# Patient Record
Sex: MALE | Race: WHITE | NOT HISPANIC OR LATINO | Employment: OTHER | URBAN - METROPOLITAN AREA
[De-identification: names, ages, dates, MRNs, and addresses within clinical notes are randomized per-mention and may not be internally consistent; named-entity substitution may affect disease eponyms.]

---

## 2021-01-11 ENCOUNTER — APPOINTMENT (EMERGENCY)
Dept: CT IMAGING | Facility: HOSPITAL | Age: 81
DRG: 392 | End: 2021-01-11
Payer: MEDICARE

## 2021-01-11 ENCOUNTER — APPOINTMENT (EMERGENCY)
Dept: RADIOLOGY | Facility: HOSPITAL | Age: 81
DRG: 392 | End: 2021-01-11
Payer: MEDICARE

## 2021-01-11 ENCOUNTER — HOSPITAL ENCOUNTER (INPATIENT)
Facility: HOSPITAL | Age: 81
LOS: 1 days | Discharge: HOME | DRG: 392 | End: 2021-01-12
Attending: EMERGENCY MEDICINE | Admitting: INTERNAL MEDICINE
Payer: MEDICARE

## 2021-01-11 DIAGNOSIS — H10.9 CONJUNCTIVITIS: ICD-10-CM

## 2021-01-11 DIAGNOSIS — R11.2 NAUSEA & VOMITING: Primary | ICD-10-CM

## 2021-01-11 DIAGNOSIS — E44.0 MODERATE PROTEIN-CALORIE MALNUTRITION (HCC): ICD-10-CM

## 2021-01-11 DIAGNOSIS — J69.0 ASPIRATION PNEUMONITIS (HCC): ICD-10-CM

## 2021-01-11 PROBLEM — F34.1 DYSTHYMIA: Status: ACTIVE | Noted: 2021-01-11

## 2021-01-11 PROBLEM — J96.01 ACUTE RESPIRATORY FAILURE WITH HYPOXIA (HCC): Status: ACTIVE | Noted: 2021-01-11

## 2021-01-11 PROBLEM — J44.9 COPD (CHRONIC OBSTRUCTIVE PULMONARY DISEASE) (HCC): Status: ACTIVE | Noted: 2021-01-11

## 2021-01-11 PROBLEM — Z72.0 TOBACCO ABUSE: Status: ACTIVE | Noted: 2021-01-11

## 2021-01-11 PROBLEM — I10 ESSENTIAL HYPERTENSION: Status: ACTIVE | Noted: 2021-01-11

## 2021-01-11 PROBLEM — N18.9 CKD (CHRONIC KIDNEY DISEASE): Status: ACTIVE | Noted: 2021-01-11

## 2021-01-11 LAB
ALBUMIN SERPL BCP-MCNC: 3.9 G/DL (ref 3.5–5)
ALP SERPL-CCNC: 128 U/L (ref 46–116)
ALT SERPL W P-5'-P-CCNC: 18 U/L (ref 12–78)
ANION GAP SERPL CALCULATED.3IONS-SCNC: 12 MMOL/L (ref 4–13)
AST SERPL W P-5'-P-CCNC: 23 U/L (ref 5–45)
BASOPHILS # BLD AUTO: 0.03 THOUSANDS/ΜL (ref 0–0.1)
BASOPHILS NFR BLD AUTO: 0 % (ref 0–1)
BILIRUB SERPL-MCNC: 0.9 MG/DL (ref 0.2–1)
BUN SERPL-MCNC: 19 MG/DL (ref 5–25)
CALCIUM SERPL-MCNC: 9.8 MG/DL (ref 8.3–10.1)
CHLORIDE SERPL-SCNC: 102 MMOL/L (ref 100–108)
CO2 SERPL-SCNC: 27 MMOL/L (ref 21–32)
CREAT SERPL-MCNC: 1.16 MG/DL (ref 0.6–1.3)
EOSINOPHIL # BLD AUTO: 0 THOUSAND/ΜL (ref 0–0.61)
EOSINOPHIL NFR BLD AUTO: 0 % (ref 0–6)
ERYTHROCYTE [DISTWIDTH] IN BLOOD BY AUTOMATED COUNT: 12.5 % (ref 11.6–15.1)
FLUAV RNA RESP QL NAA+PROBE: NEGATIVE
FLUBV RNA RESP QL NAA+PROBE: NEGATIVE
GFR SERPL CREATININE-BSD FRML MDRD: 59 ML/MIN/1.73SQ M
GLUCOSE SERPL-MCNC: 143 MG/DL (ref 65–140)
HCT VFR BLD AUTO: 49.3 % (ref 36.5–49.3)
HGB BLD-MCNC: 16.4 G/DL (ref 12–17)
IMM GRANULOCYTES # BLD AUTO: 0.03 THOUSAND/UL (ref 0–0.2)
IMM GRANULOCYTES NFR BLD AUTO: 0 % (ref 0–2)
LIPASE SERPL-CCNC: 61 U/L (ref 73–393)
LYMPHOCYTES # BLD AUTO: 1.1 THOUSANDS/ΜL (ref 0.6–4.47)
LYMPHOCYTES NFR BLD AUTO: 15 % (ref 14–44)
MCH RBC QN AUTO: 33 PG (ref 26.8–34.3)
MCHC RBC AUTO-ENTMCNC: 33.3 G/DL (ref 31.4–37.4)
MCV RBC AUTO: 99 FL (ref 82–98)
MONOCYTES # BLD AUTO: 0.16 THOUSAND/ΜL (ref 0.17–1.22)
MONOCYTES NFR BLD AUTO: 2 % (ref 4–12)
NEUTROPHILS # BLD AUTO: 6.15 THOUSANDS/ΜL (ref 1.85–7.62)
NEUTS SEG NFR BLD AUTO: 83 % (ref 43–75)
NRBC BLD AUTO-RTO: 0 /100 WBCS
PLATELET # BLD AUTO: 252 THOUSANDS/UL (ref 149–390)
PMV BLD AUTO: 10.4 FL (ref 8.9–12.7)
POTASSIUM SERPL-SCNC: 4.3 MMOL/L (ref 3.5–5.3)
PROT SERPL-MCNC: 7.5 G/DL (ref 6.4–8.2)
RBC # BLD AUTO: 4.97 MILLION/UL (ref 3.88–5.62)
RSV RNA RESP QL NAA+PROBE: NEGATIVE
SARS-COV-2 RNA RESP QL NAA+PROBE: NEGATIVE
SODIUM SERPL-SCNC: 141 MMOL/L (ref 136–145)
WBC # BLD AUTO: 7.47 THOUSAND/UL (ref 4.31–10.16)

## 2021-01-11 PROCEDURE — 36415 COLL VENOUS BLD VENIPUNCTURE: CPT

## 2021-01-11 PROCEDURE — 85025 COMPLETE CBC W/AUTO DIFF WBC: CPT | Performed by: EMERGENCY MEDICINE

## 2021-01-11 PROCEDURE — 0241U HB NFCT DS VIR RESP RNA 4 TRGT: CPT | Performed by: EMERGENCY MEDICINE

## 2021-01-11 PROCEDURE — 96375 TX/PRO/DX INJ NEW DRUG ADDON: CPT

## 2021-01-11 PROCEDURE — 96374 THER/PROPH/DIAG INJ IV PUSH: CPT

## 2021-01-11 PROCEDURE — 74176 CT ABD & PELVIS W/O CONTRAST: CPT

## 2021-01-11 PROCEDURE — 80053 COMPREHEN METABOLIC PANEL: CPT | Performed by: EMERGENCY MEDICINE

## 2021-01-11 PROCEDURE — 93005 ELECTROCARDIOGRAM TRACING: CPT

## 2021-01-11 PROCEDURE — 83690 ASSAY OF LIPASE: CPT | Performed by: EMERGENCY MEDICINE

## 2021-01-11 PROCEDURE — 99285 EMERGENCY DEPT VISIT HI MDM: CPT

## 2021-01-11 PROCEDURE — 99285 EMERGENCY DEPT VISIT HI MDM: CPT | Performed by: EMERGENCY MEDICINE

## 2021-01-11 PROCEDURE — 71045 X-RAY EXAM CHEST 1 VIEW: CPT

## 2021-01-11 PROCEDURE — G1004 CDSM NDSC: HCPCS

## 2021-01-11 RX ORDER — ONDANSETRON 2 MG/ML
4 INJECTION INTRAMUSCULAR; INTRAVENOUS ONCE
Status: COMPLETED | OUTPATIENT
Start: 2021-01-11 | End: 2021-01-11

## 2021-01-11 RX ORDER — MIRTAZAPINE 30 MG/1
30 TABLET, FILM COATED ORAL
COMMUNITY

## 2021-01-11 RX ORDER — OXYCODONE HYDROCHLORIDE 5 MG/1
5 CAPSULE ORAL EVERY 8 HOURS
COMMUNITY
End: 2021-05-18 | Stop reason: HOSPADM

## 2021-01-11 RX ORDER — ASPIRIN 81 MG/1
81 TABLET ORAL DAILY
COMMUNITY
End: 2021-05-07 | Stop reason: HOSPADM

## 2021-01-11 RX ORDER — CHLORTHALIDONE 25 MG/1
25 TABLET ORAL DAILY
COMMUNITY

## 2021-01-11 RX ORDER — PREDNISONE 10 MG/1
TABLET ORAL DAILY
COMMUNITY
End: 2021-05-18 | Stop reason: HOSPADM

## 2021-01-11 RX ORDER — METOCLOPRAMIDE HYDROCHLORIDE 5 MG/ML
5 INJECTION INTRAMUSCULAR; INTRAVENOUS ONCE
Status: COMPLETED | OUTPATIENT
Start: 2021-01-11 | End: 2021-01-11

## 2021-01-11 RX ORDER — CILOSTAZOL 100 MG/1
100 TABLET ORAL 2 TIMES DAILY
COMMUNITY
End: 2021-05-07 | Stop reason: HOSPADM

## 2021-01-11 RX ADMIN — METOCLOPRAMIDE HYDROCHLORIDE 5 MG: 5 INJECTION INTRAMUSCULAR; INTRAVENOUS at 23:07

## 2021-01-11 RX ADMIN — ONDANSETRON 4 MG: 2 INJECTION INTRAMUSCULAR; INTRAVENOUS at 22:16

## 2021-01-11 NOTE — ED PROVIDER NOTES
History  Chief Complaint   Patient presents with    Vomiting     patient presents to the ED via EMS with c/o nausea nd vomiting today, recieved zofran in route and feels better      79-year-old male presents for evaluation of nausea vomiting today with mild associated abdominal discomfort  The patient has an associated nonproductive cough which began today as well  The patient has a history of CAD, hypertension, kidney disease and COPD  The patient was given Zofran via EMS and is improved  The patient denies any sick contacts  Prior to Admission Medications   Prescriptions Last Dose Informant Patient Reported? Taking?   aspirin (ECOTRIN LOW STRENGTH) 81 mg EC tablet   Yes Yes   Sig: Take 81 mg by mouth daily   chlorthalidone 25 mg tablet   Yes Yes   Sig: Take 25 mg by mouth daily   cilostazol (PLETAL) 100 mg tablet   Yes Yes   Sig: Take 100 mg by mouth 2 (two) times a day   mirtazapine (REMERON) 30 mg tablet   Yes Yes   Sig: Take 30 mg by mouth daily at bedtime   oxyCODONE (OXY-IR) 5 MG capsule   Yes Yes   Sig: Take 5 mg by mouth every 8 (eight) hours   predniSONE 10 mg tablet   Yes Yes   Sig: Take by mouth daily      Facility-Administered Medications: None       Past Medical History:   Diagnosis Date    COPD (chronic obstructive pulmonary disease) (HCC)     MI, old        Past Surgical History:   Procedure Laterality Date    BELOW KNEE LEG AMPUTATION      CARDIAC SURGERY      HERNIA REPAIR         History reviewed  No pertinent family history  I have reviewed and agree with the history as documented  E-Cigarette/Vaping     E-Cigarette/Vaping Substances     Social History     Tobacco Use    Smoking status: Current Every Day Smoker     Packs/day: 1 00     Types: Cigarettes    Smokeless tobacco: Never Used   Substance Use Topics    Alcohol use: Yes     Comment: socially     Drug use: Not Currently       Review of Systems   Constitutional: Positive for chills  Negative for fatigue and fever  HENT: Negative for sore throat  Respiratory: Positive for cough  Negative for chest tightness and shortness of breath  Cardiovascular: Negative for chest pain and palpitations  Gastrointestinal: Positive for abdominal pain, nausea and vomiting  Negative for blood in stool, constipation and diarrhea  Genitourinary: Negative for difficulty urinating and dysuria  Musculoskeletal: Negative for back pain  Skin: Negative for rash  Neurological: Negative for dizziness, seizures, syncope, weakness and headaches  All other systems reviewed and are negative  Physical Exam  Physical Exam  Vitals signs and nursing note reviewed  Constitutional:       General: He is not in acute distress  Appearance: He is well-developed  HENT:      Head: Normocephalic and atraumatic  Right Ear: External ear normal       Left Ear: External ear normal       Mouth/Throat:      Pharynx: No oropharyngeal exudate  Eyes:      General: No scleral icterus  Pupils: Pupils are equal, round, and reactive to light  Neck:      Musculoskeletal: Normal range of motion and neck supple  Cardiovascular:      Rate and Rhythm: Normal rate and regular rhythm  Heart sounds: Normal heart sounds  Pulmonary:      Effort: Pulmonary effort is normal  No respiratory distress  Breath sounds: Normal breath sounds  Abdominal:      General: Bowel sounds are normal       Palpations: Abdomen is soft  Tenderness: There is no abdominal tenderness  There is no guarding or rebound  Musculoskeletal: Normal range of motion  Skin:     General: Skin is warm and dry  Findings: No rash  Neurological:      Mental Status: He is alert and oriented to person, place, and time           Vital Signs  ED Triage Vitals [01/11/21 1828]   Temperature Pulse Respirations Blood Pressure SpO2   (!) 97 1 °F (36 2 °C) 82 20 163/86 95 %      Temp Source Heart Rate Source Patient Position - Orthostatic VS BP Location FiO2 (%) Tympanic Monitor -- -- --      Pain Score       --           Vitals:    01/11/21 1828   BP: 163/86   Pulse: 82         Visual Acuity      ED Medications  Medications - No data to display    Diagnostic Studies  Results Reviewed     Procedure Component Value Units Date/Time    COVID19, Influenza A/B, RSV PCR, [facility] [[id number]]     Lab Status: No result Specimen: Nares from Nasopharyngeal Swab     CBC and differential [[id number]]  (Abnormal) Collected: 01/11/21 1830    Lab Status: Final result Specimen: Blood from Arm, Left Updated: 01/11/21 1837     WBC 7 47 Thousand/uL      RBC 4 97 Million/uL      Hemoglobin 16 4 g/dL      Hematocrit 49 3 %      MCV 99 fL      MCH 33 0 pg      MCHC 33 3 g/dL      RDW 12 5 %      MPV 10 4 fL      Platelets 476 Thousands/uL      nRBC 0 /100 WBCs      Neutrophils Relative 83 %      Immat GRANS % 0 %      Lymphocytes Relative 15 %      Monocytes Relative 2 %      Eosinophils Relative 0 %      Basophils Relative 0 %      Neutrophils Absolute 6 15 Thousands/µL      Immature Grans Absolute 0 03 Thousand/uL      Lymphocytes Absolute 1 10 Thousands/µL      Monocytes Absolute 0 16 Thousand/µL      Eosinophils Absolute 0 00 Thousand/µL      Basophils Absolute 0 03 Thousands/µL     Comprehensive metabolic panel [[id number]] Collected: 01/11/21 1830    Lab Status: In process Specimen: Blood from Arm, Left Updated: 01/11/21 1834    Lipase [[id number]] Collected: 01/11/21 1830    Lab Status: In process Specimen: Blood from Arm, Left Updated: 01/11/21 1834                 XR chest 1 view portable    (Results Pending)   CT abdomen pelvis wo contrast    (Results Pending)              Procedures  ECG 12 Lead Documentation Only    Date/Time: 1/11/2021 6:53 PM  Performed by: Jessa Blanco DO  Authorized by:  Jessa Blanco DO     Indications / Diagnosis:  Shortness of breath  ECG reviewed by me, the ED Provider: yes    Patient location:  ED  Previous ECG:     Previous ECG:  Compared to current Comparison ECG info:  10/29/2014    Similarity:  No change  Interpretation:     Interpretation: normal    Rate:     ECG rate:  71    ECG rate assessment: normal    Rhythm:     Rhythm: sinus rhythm    Ectopy:     Ectopy: none    QRS:     QRS axis:  Normal    QRS intervals:  Normal  Conduction:     Conduction: normal    ST segments:     ST segments:  Normal  T waves:     T waves: normal               ED Course  ED Course as of Jan 12 1601   Mon Jan 11, 2021   2151 Patient with recurrent nausea and feeling like he is going to vomit upon re-evaluation  Will re-dose with Zofran then PO p o  Trial                                              MDM  Number of Diagnoses or Management Options  Aspiration pneumonitis [facility]):   Nausea & vomiting:   Diagnosis management comments: Plan is to obtain laboratories and CT the abdomen and chest x-ray the differential diagnosis includes viral enteritis, pneumonia, COVID, anemia, electrolyte disturbance, obstruction, ileus, other        Disposition  Final diagnoses:   None     ED Disposition     None      Follow-up Information    None         Patient's Medications   Discharge Prescriptions    No medications on file     No discharge procedures on file      PDMP Review     None          ED Provider  Electronically Signed by           Artem Rosa DO  01/12/21 4300

## 2021-01-12 ENCOUNTER — HOSPITAL ENCOUNTER (EMERGENCY)
Facility: HOSPITAL | Age: 81
Discharge: HOME | End: 2021-01-12
Attending: EMERGENCY MEDICINE | Admitting: EMERGENCY MEDICINE
Payer: MEDICARE

## 2021-01-12 ENCOUNTER — TELEPHONE (OUTPATIENT)
Dept: OTHER | Facility: OTHER | Age: 81
End: 2021-01-12

## 2021-01-12 VITALS
DIASTOLIC BLOOD PRESSURE: 80 MMHG | BODY MASS INDEX: 17.27 KG/M2 | WEIGHT: 110 LBS | HEART RATE: 71 BPM | OXYGEN SATURATION: 95 % | HEIGHT: 67 IN | RESPIRATION RATE: 35 BRPM | TEMPERATURE: 97.1 F | SYSTOLIC BLOOD PRESSURE: 161 MMHG

## 2021-01-12 VITALS
TEMPERATURE: 97.4 F | RESPIRATION RATE: 18 BRPM | SYSTOLIC BLOOD PRESSURE: 141 MMHG | DIASTOLIC BLOOD PRESSURE: 67 MMHG | HEART RATE: 74 BPM | OXYGEN SATURATION: 94 %

## 2021-01-12 DIAGNOSIS — F32.A ANXIETY AND DEPRESSION: ICD-10-CM

## 2021-01-12 DIAGNOSIS — F41.9 ANXIETY AND DEPRESSION: ICD-10-CM

## 2021-01-12 DIAGNOSIS — R11.2 NAUSEA AND VOMITING: Primary | ICD-10-CM

## 2021-01-12 PROBLEM — E44.0 MODERATE PROTEIN-CALORIE MALNUTRITION (HCC): Status: ACTIVE | Noted: 2021-01-12

## 2021-01-12 PROBLEM — J96.11 CHRONIC RESPIRATORY FAILURE WITH HYPOXIA (HCC): Status: ACTIVE | Noted: 2021-01-11

## 2021-01-12 PROBLEM — Z89.512: Status: ACTIVE | Noted: 2021-01-12

## 2021-01-12 LAB
ALBUMIN SERPL BCP-MCNC: 3.4 G/DL (ref 3.5–5)
ALP SERPL-CCNC: 110 U/L (ref 46–116)
ALT SERPL W P-5'-P-CCNC: 25 U/L (ref 12–78)
ANION GAP SERPL CALCULATED.3IONS-SCNC: 7 MMOL/L (ref 4–13)
ANION GAP SERPL CALCULATED.3IONS-SCNC: 9 MMOL/L (ref 4–13)
AST SERPL W P-5'-P-CCNC: 40 U/L (ref 5–45)
ATRIAL RATE: 71 BPM
BASOPHILS # BLD AUTO: 0.02 THOUSANDS/ΜL (ref 0–0.1)
BASOPHILS # BLD AUTO: 0.03 THOUSANDS/ΜL (ref 0–0.1)
BASOPHILS NFR BLD AUTO: 0 % (ref 0–1)
BASOPHILS NFR BLD AUTO: 0 % (ref 0–1)
BILIRUB SERPL-MCNC: 1 MG/DL (ref 0.2–1)
BUN SERPL-MCNC: 21 MG/DL (ref 5–25)
BUN SERPL-MCNC: 21 MG/DL (ref 5–25)
CALCIUM ALBUM COR SERPL-MCNC: 9.8 MG/DL (ref 8.3–10.1)
CALCIUM SERPL-MCNC: 8.7 MG/DL (ref 8.3–10.1)
CALCIUM SERPL-MCNC: 9.3 MG/DL (ref 8.3–10.1)
CHLORIDE SERPL-SCNC: 102 MMOL/L (ref 100–108)
CHLORIDE SERPL-SCNC: 105 MMOL/L (ref 100–108)
CO2 SERPL-SCNC: 27 MMOL/L (ref 21–32)
CO2 SERPL-SCNC: 28 MMOL/L (ref 21–32)
CREAT SERPL-MCNC: 0.99 MG/DL (ref 0.6–1.3)
CREAT SERPL-MCNC: 1.13 MG/DL (ref 0.6–1.3)
EOSINOPHIL # BLD AUTO: 0 THOUSAND/ΜL (ref 0–0.61)
EOSINOPHIL # BLD AUTO: 0.01 THOUSAND/ΜL (ref 0–0.61)
EOSINOPHIL NFR BLD AUTO: 0 % (ref 0–6)
EOSINOPHIL NFR BLD AUTO: 0 % (ref 0–6)
ERYTHROCYTE [DISTWIDTH] IN BLOOD BY AUTOMATED COUNT: 12.7 % (ref 11.6–15.1)
ERYTHROCYTE [DISTWIDTH] IN BLOOD BY AUTOMATED COUNT: 13.1 % (ref 11.6–15.1)
GFR SERPL CREATININE-BSD FRML MDRD: 61 ML/MIN/1.73SQ M
GFR SERPL CREATININE-BSD FRML MDRD: 71 ML/MIN/1.73SQ M
GLUCOSE SERPL-MCNC: 102 MG/DL (ref 65–140)
GLUCOSE SERPL-MCNC: 109 MG/DL (ref 65–140)
HCT VFR BLD AUTO: 41.4 % (ref 36.5–49.3)
HCT VFR BLD AUTO: 46.8 % (ref 36.5–49.3)
HGB BLD-MCNC: 13.7 G/DL (ref 12–17)
HGB BLD-MCNC: 15.1 G/DL (ref 12–17)
IMM GRANULOCYTES # BLD AUTO: 0.01 THOUSAND/UL (ref 0–0.2)
IMM GRANULOCYTES # BLD AUTO: 0.05 THOUSAND/UL (ref 0–0.2)
IMM GRANULOCYTES NFR BLD AUTO: 0 % (ref 0–2)
IMM GRANULOCYTES NFR BLD AUTO: 1 % (ref 0–2)
LYMPHOCYTES # BLD AUTO: 1.55 THOUSANDS/ΜL (ref 0.6–4.47)
LYMPHOCYTES # BLD AUTO: 1.77 THOUSANDS/ΜL (ref 0.6–4.47)
LYMPHOCYTES NFR BLD AUTO: 17 % (ref 14–44)
LYMPHOCYTES NFR BLD AUTO: 17 % (ref 14–44)
MCH RBC QN AUTO: 32.8 PG (ref 26.8–34.3)
MCH RBC QN AUTO: 33.1 PG (ref 26.8–34.3)
MCHC RBC AUTO-ENTMCNC: 32.3 G/DL (ref 31.4–37.4)
MCHC RBC AUTO-ENTMCNC: 33.1 G/DL (ref 31.4–37.4)
MCV RBC AUTO: 100 FL (ref 82–98)
MCV RBC AUTO: 102 FL (ref 82–98)
MONOCYTES # BLD AUTO: 1.05 THOUSAND/ΜL (ref 0.17–1.22)
MONOCYTES # BLD AUTO: 1.16 THOUSAND/ΜL (ref 0.17–1.22)
MONOCYTES NFR BLD AUTO: 10 % (ref 4–12)
MONOCYTES NFR BLD AUTO: 13 % (ref 4–12)
NEUTROPHILS # BLD AUTO: 6.27 THOUSANDS/ΜL (ref 1.85–7.62)
NEUTROPHILS # BLD AUTO: 7.81 THOUSANDS/ΜL (ref 1.85–7.62)
NEUTS SEG NFR BLD AUTO: 70 % (ref 43–75)
NEUTS SEG NFR BLD AUTO: 72 % (ref 43–75)
NRBC BLD AUTO-RTO: 0 /100 WBCS
P AXIS: 60 DEGREES
PLATELET # BLD AUTO: 206 THOUSANDS/UL (ref 149–390)
PLATELET # BLD AUTO: 215 THOUSANDS/UL (ref 149–390)
PMV BLD AUTO: 10.1 FL (ref 8.9–12.7)
PMV BLD AUTO: 10.1 FL (ref 8.9–12.7)
POTASSIUM SERPL-SCNC: 3.6 MMOL/L (ref 3.5–5.3)
POTASSIUM SERPL-SCNC: 3.7 MMOL/L (ref 3.5–5.3)
PR INTERVAL: 146 MS
PROT SERPL-MCNC: 7.1 G/DL (ref 6.4–8.2)
QRS AXIS: 56 DEGREES
QRSD INTERVAL: 80 MS
QT INTERVAL: 438 MS
QTC INTERVAL: 475 MS
RBC # BLD AUTO: 4.14 MILLION/UL (ref 3.88–5.62)
RBC # BLD AUTO: 4.6 MILLION/UL (ref 3.88–5.62)
SODIUM SERPL-SCNC: 137 MMOL/L (ref 136–145)
SODIUM SERPL-SCNC: 141 MMOL/L (ref 136–145)
T WAVE AXIS: 32 DEGREES
VENTRICULAR RATE: 71 BPM
WBC # BLD AUTO: 10.72 THOUSAND/UL (ref 4.31–10.16)
WBC # BLD AUTO: 9.01 THOUSAND/UL (ref 4.31–10.16)

## 2021-01-12 PROCEDURE — 93010 ELECTROCARDIOGRAM REPORT: CPT | Performed by: INTERNAL MEDICINE

## 2021-01-12 PROCEDURE — 99284 EMERGENCY DEPT VISIT MOD MDM: CPT | Performed by: EMERGENCY MEDICINE

## 2021-01-12 PROCEDURE — 99285 EMERGENCY DEPT VISIT HI MDM: CPT

## 2021-01-12 PROCEDURE — 36415 COLL VENOUS BLD VENIPUNCTURE: CPT | Performed by: INTERNAL MEDICINE

## 2021-01-12 PROCEDURE — 85025 COMPLETE CBC W/AUTO DIFF WBC: CPT | Performed by: INTERNAL MEDICINE

## 2021-01-12 PROCEDURE — 80048 BASIC METABOLIC PNL TOTAL CA: CPT | Performed by: INTERNAL MEDICINE

## 2021-01-12 PROCEDURE — 80053 COMPREHEN METABOLIC PANEL: CPT | Performed by: EMERGENCY MEDICINE

## 2021-01-12 PROCEDURE — 99236 HOSP IP/OBS SAME DATE HI 85: CPT | Performed by: INTERNAL MEDICINE

## 2021-01-12 PROCEDURE — 85025 COMPLETE CBC W/AUTO DIFF WBC: CPT | Performed by: EMERGENCY MEDICINE

## 2021-01-12 RX ORDER — MIRTAZAPINE 15 MG/1
30 TABLET, FILM COATED ORAL
Status: DISCONTINUED | OUTPATIENT
Start: 2021-01-12 | End: 2021-01-12 | Stop reason: HOSPADM

## 2021-01-12 RX ORDER — ONDANSETRON 4 MG/1
4 TABLET, ORALLY DISINTEGRATING ORAL ONCE
Status: COMPLETED | OUTPATIENT
Start: 2021-01-12 | End: 2021-01-12

## 2021-01-12 RX ORDER — ACETAMINOPHEN 325 MG/1
650 TABLET ORAL EVERY 6 HOURS PRN
Status: DISCONTINUED | OUTPATIENT
Start: 2021-01-12 | End: 2021-01-12 | Stop reason: HOSPADM

## 2021-01-12 RX ORDER — ERYTHROMYCIN 5 MG/G
0.5 OINTMENT OPHTHALMIC EVERY 8 HOURS SCHEDULED
Status: DISCONTINUED | OUTPATIENT
Start: 2021-01-12 | End: 2021-01-12 | Stop reason: HOSPADM

## 2021-01-12 RX ORDER — CILOSTAZOL 50 MG/1
100 TABLET ORAL 2 TIMES DAILY
Status: DISCONTINUED | OUTPATIENT
Start: 2021-01-12 | End: 2021-01-12 | Stop reason: HOSPADM

## 2021-01-12 RX ORDER — OXYCODONE HYDROCHLORIDE 5 MG/1
5 TABLET ORAL EVERY 8 HOURS
Status: DISCONTINUED | OUTPATIENT
Start: 2021-01-12 | End: 2021-01-12 | Stop reason: HOSPADM

## 2021-01-12 RX ORDER — ASPIRIN 81 MG/1
81 TABLET ORAL DAILY
Status: DISCONTINUED | OUTPATIENT
Start: 2021-01-12 | End: 2021-01-12 | Stop reason: HOSPADM

## 2021-01-12 RX ORDER — NICOTINE 21 MG/24HR
1 PATCH, TRANSDERMAL 24 HOURS TRANSDERMAL DAILY
Status: DISCONTINUED | OUTPATIENT
Start: 2021-01-12 | End: 2021-01-12 | Stop reason: HOSPADM

## 2021-01-12 RX ORDER — ERYTHROMYCIN 5 MG/G
0.5 OINTMENT OPHTHALMIC EVERY 8 HOURS SCHEDULED
Qty: 3.5 G | Refills: 0 | Status: SHIPPED | OUTPATIENT
Start: 2021-01-12

## 2021-01-12 RX ORDER — ONDANSETRON 4 MG/1
4 TABLET, ORALLY DISINTEGRATING ORAL EVERY 6 HOURS PRN
Qty: 20 TABLET | Refills: 0 | Status: SHIPPED | OUTPATIENT
Start: 2021-01-12 | End: 2021-05-18 | Stop reason: HOSPADM

## 2021-01-12 RX ORDER — METOCLOPRAMIDE HYDROCHLORIDE 5 MG/ML
10 INJECTION INTRAMUSCULAR; INTRAVENOUS EVERY 6 HOURS PRN
Status: DISCONTINUED | OUTPATIENT
Start: 2021-01-12 | End: 2021-01-12 | Stop reason: HOSPADM

## 2021-01-12 RX ORDER — ONDANSETRON 4 MG/1
4 TABLET, ORALLY DISINTEGRATING ORAL EVERY 6 HOURS PRN
Qty: 20 TABLET | Refills: 0 | Status: SHIPPED | OUTPATIENT
Start: 2021-01-12 | End: 2021-01-12 | Stop reason: SDUPTHER

## 2021-01-12 RX ORDER — SODIUM CHLORIDE 9 MG/ML
75 INJECTION, SOLUTION INTRAVENOUS CONTINUOUS
Status: DISCONTINUED | OUTPATIENT
Start: 2021-01-12 | End: 2021-01-12 | Stop reason: HOSPADM

## 2021-01-12 RX ORDER — CHLORTHALIDONE 25 MG/1
25 TABLET ORAL DAILY
Status: DISCONTINUED | OUTPATIENT
Start: 2021-01-12 | End: 2021-01-12 | Stop reason: HOSPADM

## 2021-01-12 RX ORDER — ONDANSETRON 2 MG/ML
4 INJECTION INTRAMUSCULAR; INTRAVENOUS EVERY 6 HOURS PRN
Status: DISCONTINUED | OUTPATIENT
Start: 2021-01-12 | End: 2021-01-12 | Stop reason: HOSPADM

## 2021-01-12 RX ADMIN — SODIUM CHLORIDE 75 ML/HR: 0.9 INJECTION, SOLUTION INTRAVENOUS at 01:56

## 2021-01-12 RX ADMIN — ONDANSETRON 4 MG: 4 TABLET, ORALLY DISINTEGRATING ORAL at 17:02

## 2021-01-12 RX ADMIN — ENOXAPARIN SODIUM 40 MG: 40 INJECTION SUBCUTANEOUS at 08:43

## 2021-01-12 RX ADMIN — ERYTHROMYCIN 0.5 INCH: 5 OINTMENT OPHTHALMIC at 08:44

## 2021-01-12 RX ADMIN — CHLORTHALIDONE 25 MG: 25 TABLET ORAL at 08:44

## 2021-01-12 RX ADMIN — CILOSTAZOL 100 MG: 50 TABLET ORAL at 08:44

## 2021-01-12 RX ADMIN — NICOTINE 1 PATCH: 21 PATCH, EXTENDED RELEASE TRANSDERMAL at 08:43

## 2021-01-12 RX ADMIN — ASPIRIN 81 MG: 81 TABLET, DELAYED RELEASE ORAL at 08:44

## 2021-01-12 NOTE — ASSESSMENT & PLAN NOTE
· Patient presenting with Intractable vomiting and nausea, Came into ED by EMS  Reported to have been vomitting since 1/10  Has not been able to keep any food and minimal fluids down  Given zofran and metoclompramide in the ED  Continues to have nausea  Has vomited twice while in the ED  · CT Abdo pelvis: Colonic diverticulosis without evidence of acute diverticulitis    · Continue zofran and reglan as needed for N/V  · Status post gentle fluids   · Patient clinically improved, symptoms most likely viral  · Electrolytes K+ and sodium stable at this time  Nausea, vomiting resolved, patient denies abdominal pain and is requesting to be discharged  Medically stable for discharge, to follow up with his primary care physician within 1 week

## 2021-01-12 NOTE — ASSESSMENT & PLAN NOTE
· Nonreproductive cough and requiring 1 L of nasal canula oxygen at 94%  · Has COPD, at baseline does not wear oxygen during the day but wears 3 L at night   · Does not use any inhalers at home for COPD  · Continues to smoke 1 ppd    · Covid -, afebrile, no WBC   · CXR pending read, patient denies aspiration with vomiting

## 2021-01-12 NOTE — ASSESSMENT & PLAN NOTE
Lab Results   Component Value Date    EGFR 59 01/11/2021    CREATININE 1 16 01/11/2021    CREATININE 1 00 01/17/2014   ·   · Cr at baseline on admission   · Patient unaware of what stage kidney disease he is at  · Continue to monitor

## 2021-01-12 NOTE — ASSESSMENT & PLAN NOTE
Lab Results   Component Value Date    EGFR 71 01/12/2021    EGFR 59 01/11/2021    CREATININE 0 99 01/12/2021    CREATININE 1 16 01/11/2021    CREATININE 1 00 01/17/2014     · History of CKD, unknown baseline  · Creatinine 1 16 on admission

## 2021-01-12 NOTE — H&P
H&P- Flora Falk 1940, 79 y o  male MRN: [id number]    Unit/Bed#: ED 11 Encounter: [id number]  Primary Care Provider: No primary care provider on file  Date and time admitted to hospital: 1/11/2021  6:27 PM    * Intractable vomiting with nausea  Assessment & Plan  · Came into ED by EMS  Reported to have been vomitting since 1/10  Has not been able to keep any food and minimal fluids down  Given zofran and metoclompramide in the ED  Continues to have nausea  Has vomited twice while in the ED  Slight discomfort in the abdomen  · CT Abdo pelvis: Colonic diverticulosis without evidence of acute diverticulitis  · Continue zofran and reglan as needed for N/V  · Started on gentle fluids   · Electrolytes K+ and sodium stable at this time  · Continue to monitor   · CM consulted     Acute respiratory failure with hypoxia (HCC)  Assessment & Plan  · Nonreproductive cough and requiring 1 L of nasal canula oxygen at 94%  · Has COPD, at baseline does not wear oxygen during the day but wears 3 L at night   · Does not use any inhalers at home for COPD  · Continues to smoke 1 ppd  · Covid -, afebrile, no WBC   · CXR pending read, patient denies aspiration with vomiting     Moderate protein-calorie malnutrition ([address] )  Assessment & Plan  Malnutrition Findings:           BMI Findings: Body mass index is 17 23 kg/m²  · Consult nutrition       Amputee, below knee, left ([address] )  Assessment & Plan  · Amputation done in 2019 after patient had a blood clot left untreated in the leg  · Patient uses a prosthetic leg and sees physical therapy 4x a week outpatient  · Patient did not bring prosthetic to the hospital, unable to ambulate without       Tobacco abuse  Assessment & Plan  · Smokes 1 ppd - 11/2 ppd   · Encourage smoking cessation     Dysthymia  Assessment & Plan  · Continue mirtazepine 30 mg daily     CKD (chronic kidney disease)  Assessment & Plan  Lab Results   Component Value Date    EGFR 59 01/11/2021 CREATININE 1 16 01/11/2021    CREATININE 1 00 01/17/2014   ·   · Cr at baseline on admission   · Patient unaware of what stage kidney disease he is at  · Continue to monitor     COPD (chronic obstructive pulmonary disease) ([facility] [address] )  Assessment & Plan  · At baseline wear 3 L nasal canula at bed  Does not require oxygen during the day      Essential hypertension  Assessment & Plan  · Continue chlorthalidone 25 mg daily and pletal 100 mg bid   · BP in /77   · Continue to monitor       VTE Prophylaxis: Enoxaparin (Lovenox)  / sequential compression device   Code Status: Level 3 DNR/DNI  POLST: There is no POLST form on file for this patient (pre-hospital)  Discussion with family: No     Anticipated Length of Stay:  Patient will be admitted on an Inpatient basis with an anticipated length of stay of  > 2 midnights  Justification for Hospital Stay: Intractable vomiting     Total Time for Visit, including Counseling / Coordination of Care: 1 hour  Greater than 50% of this total time spent on direct patient counseling and coordination of care  Chief Complaint:   Vomiting for two days     History of Present Illness:    Angela Craft is a 79 y o  male with PMH of COPD, HTN, left leg amputation who presents with vomiting for the past two days  Has been unable to keep any food down only small amounts of water  Also reports a nonproductive cough that started today  Denies fevers or chills  Patient now requiring 1 L of oxygen in bed  At baseline patient reports that he wears 3 L every night to sleep but no oxygen during the day  After receiving zofran and reglan patient has some relief of nausea but has puked two times in the ED  Denies chest pain, diarrhea, constipation  Review of Systems:    Review of Systems   Constitutional: Positive for fatigue  Negative for chills and fever  HENT: Negative for sore throat  Respiratory: Positive for cough  Negative for chest tightness      Cardiovascular: Negative for chest pain  Gastrointestinal: Positive for abdominal pain, nausea and vomiting  Negative for abdominal distention and diarrhea  Genitourinary: Negative for difficulty urinating  Musculoskeletal: Negative for arthralgias  Neurological: Negative for weakness and headaches  Psychiatric/Behavioral: Negative for agitation and behavioral problems  All other systems reviewed and are negative  Past Medical and Surgical History:     Past Medical History:   Diagnosis Date    COPD (chronic obstructive pulmonary disease) ([facility] [address] )     MI, old        Past Surgical History:   Procedure Laterality Date    BELOW KNEE LEG AMPUTATION      CARDIAC SURGERY      HERNIA REPAIR         Meds/Allergies:    Prior to Admission medications    Medication Sig Start Date End Date Taking? Authorizing Provider   aspirin (ECOTRIN LOW STRENGTH) 81 mg EC tablet Take 81 mg by mouth daily   Yes Historical Provider, MD   chlorthalidone 25 mg tablet Take 25 mg by mouth daily   Yes Historical Provider, MD   cilostazol (PLETAL) 100 mg tablet Take 100 mg by mouth 2 (two) times a day   Yes Historical Provider, MD   mirtazapine (REMERON) 30 mg tablet Take 30 mg by mouth daily at bedtime   Yes Historical Provider, MD   oxyCODONE (OXY-IR) 5 MG capsule Take 5 mg by mouth every 8 (eight) hours   Yes Historical Provider, MD   predniSONE 10 mg tablet Take by mouth daily   Yes Historical Provider, MD     I have reviewed home medications with patient personally      Allergies: No Known Allergies    Social History:     Marital Status: /Civil Union   Occupation: [employer]  Patient Pre-hospital Living Situation: Home   Patient Pre-hospital Level of Mobility: Limited, amputee  Patient Pre-hospital Diet Restrictions: Regular  Substance Use History:   Social History     Substance and Sexual Activity   Alcohol Use Yes    Comment: socially      Social History     Tobacco Use   Smoking Status Current Every Day Smoker    Packs/day: 1 00    Types: Cigarettes Smokeless Tobacco Never Used     Social History     Substance and Sexual Activity   Drug Use Not Currently       Family History:    History reviewed  No pertinent family history  Physical Exam:     Vitals:   Blood Pressure: 163/76 (01/12/21 0030)  Pulse: 71 (01/12/21 0030)  Temperature: (!) 97 1 °F (36 2 °C) (01/11/21 1828)  Temp Source: Tympanic (01/11/21 1828)  Respirations: (!) 31 (01/12/21 0030)  Height: 5' 7" (170 2 cm) (01/11/21 1824)  Weight - Scale: 49 9 kg (110 lb) (01/11/21 1824)  SpO2: 99 % (01/12/21 0030)    Physical Exam  Vitals signs and nursing note reviewed  Constitutional:       Appearance: Normal appearance  HENT:      Head: Normocephalic  Eyes:      General:         Right eye: Discharge present  Left eye: Discharge present  Extraocular Movements: Extraocular movements intact  Pupils: Pupils are equal, round, and reactive to light  Neck:      Musculoskeletal: Normal range of motion  Cardiovascular:      Rate and Rhythm: Normal rate and regular rhythm  Heart sounds: No murmur  No gallop  Pulmonary:      Effort: No respiratory distress  Breath sounds: Normal breath sounds  No wheezing  Abdominal:      General: Bowel sounds are normal  There is no distension  Tenderness: There is abdominal tenderness  Musculoskeletal: Normal range of motion  Right lower leg: No edema  Left lower leg: No edema  Skin:     General: Skin is warm  Neurological:      General: No focal deficit present  Mental Status: He is alert and oriented to person, place, and time  Mental status is at baseline  Psychiatric:         Mood and Affect: Mood normal          Behavior: Behavior normal          Thought Content: Thought content normal          Additional Data:     Lab Results: I have personally reviewed pertinent reports        Results from last 7 days   Lab Units 01/11/21  1830   WBC Thousand/uL 7 47   HEMOGLOBIN g/dL 16 4   HEMATOCRIT % 49 3   PLATELETS Thousands/uL 252   NEUTROS PCT % 83*   LYMPHS PCT % 15   MONOS PCT % 2*   EOS PCT % 0     Results from last 7 days   Lab Units 01/11/21  1830   SODIUM mmol/L 141   POTASSIUM mmol/L 4 3   CHLORIDE mmol/L 102   CO2 mmol/L 27   BUN mg/dL 19   CREATININE mg/dL 1 16   ANION GAP mmol/L 12   CALCIUM mg/dL 9 8   ALBUMIN g/dL 3 9   TOTAL BILIRUBIN mg/dL 0 90   ALK PHOS U/L 128*   ALT U/L 18   AST U/L 23   GLUCOSE RANDOM mg/dL 143*                       Imaging: I have personally reviewed pertinent reports  CT abdomen pelvis wo contrast   Final Result by Jeffry Moon DO (01/11 2143)      Colonic diverticulosis without evidence of acute diverticulitis  Workstation performed: [id number]         XR chest 1 view portable   ED Interpretation by Kitty Grady DO (01/11 2136)   Abnormal   No acute infiltrate or pneumothorax  ? Thoracic aortic dilatation          Allscripts / Epic Records Reviewed: Yes     ** Please Note: This note has been constructed using a voice recognition system   **

## 2021-01-12 NOTE — ASSESSMENT & PLAN NOTE
· Left BKA - carried out in 2019  · Patient uses a prosthetic leg and sees physical therapy 4x a week outpatient  · To continue with outpatient care

## 2021-01-12 NOTE — ASSESSMENT & PLAN NOTE
· Chronic hypoxic respiratory failure, uses 3 L at night/prn secondary to history of COPD  · Noted to have chronic nonreproductive cough and requiring 1 L of nasal canula oxygen at 94%  · Currently not in acute exacerbation  · Does not use any inhalers at home for COPD  · Continues to smoke 1 ppd    · Covid - afebrile, no WBC   · CXR - no acute cardiopulmonary disease

## 2021-01-12 NOTE — ED NOTES
Patient provided with a bedside commode, he was able to use it independently       Tobi Sahu RN  01/12/21 8678

## 2021-01-12 NOTE — TELEPHONE ENCOUNTER
Pt  Arthur Morales/CHELSEA: 1940 was discharged today and Pt 's daughter is stating that Pt  continues to vomit  Pt  seems to be worsening  Daughter also states that medication was sent to the wrong Pharmacy   Daughter is requesting a callback @ [phone]

## 2021-01-12 NOTE — DISCHARGE SUMMARY
Discharge- Markell Sees 1940, 79 y o  male MRN: [id number]    Unit/Bed#: ED 11 Encounter: [id number]    Primary Care Provider: No primary care provider on file  Date and time admitted to hospital: 1/11/2021  6:27 PM        * Intractable vomiting with nausea  Assessment & Plan  · Patient presenting with Intractable vomiting and nausea, Came into ED by EMS  Reported to have been vomitting since 1/10  Has not been able to keep any food and minimal fluids down  Given zofran and metoclompramide in the ED  Continues to have nausea  Has vomited twice while in the ED  · CT Abdo pelvis: Colonic diverticulosis without evidence of acute diverticulitis  · Continue zofran and reglan as needed for N/V  · Status post gentle fluids   · Patient clinically improved, symptoms most likely viral  · Electrolytes K+ and sodium stable at this time  Nausea, vomiting resolved, patient denies abdominal pain and is requesting to be discharged  Medically stable for discharge, to follow up with his primary care physician within 1 week    Amputee, below knee, left ([facility] [facility] )  Assessment & Plan  · Left BKA - carried out in 2019  · Patient uses a prosthetic leg and sees physical therapy 4x a week outpatient  · To continue with outpatient care     Tobacco abuse  Assessment & Plan  · Smokes 1 ppd - 11/2 ppd   · Encourage smoking cessation     Dysthymia  Assessment & Plan  · Continue mirtazapine 30 mg daily     CKD (chronic kidney disease)  Assessment & Plan  Lab Results   Component Value Date    EGFR 71 01/12/2021    EGFR 59 01/11/2021    CREATININE 0 99 01/12/2021    CREATININE 1 16 01/11/2021    CREATININE 1 00 01/17/2014     · History of CKD, unknown baseline  · Creatinine 1 16 on admission        COPD (chronic obstructive pulmonary disease) ([facility] [address] )  Assessment & Plan  · At baseline wear 3 L nasal canula at bed   Does not require oxygen during the day      Essential hypertension  Assessment & Plan  · Continue chlorthalidone 25 mg daily and pletal 100 mg bid   · BP in /77   · Continue to monitor     Chronic respiratory failure with hypoxia (HCC)  Assessment & Plan  · Chronic hypoxic respiratory failure, uses 3 L at night/prn secondary to history of COPD  · Noted to have chronic nonreproductive cough and requiring 1 L of nasal canula oxygen at 94%  · Currently not in acute exacerbation  · Does not use any inhalers at home for COPD  · Continues to smoke 1 ppd  · Covid - afebrile, no WBC   · CXR - no acute cardiopulmonary disease          Discharging Physician / Practitioner: Clemente Baca MD  PCP: No primary care provider on file  Admission Date:   Admission Orders (From admission, onward)     Ordered        01/11/21 2309  Inpatient Admission  Once                   Discharge Date: 01/12/21    Resolved Problems  Date Reviewed: 1/12/2021    None          Consultations During Hospital Stay:  ·     Procedures Performed:   ·     Significant Findings / Test Results:   · 1/11/21 Ct abd/pelvis    ADRENAL GLANDS:  There is low density lobulated thickening of adrenal glands bilaterally statistically most likely to represent adenomatous hyperplasia      KIDNEYS/URETERS:  No hydronephrosis or urinary tract calculus  Recent literature (Radiology, 2019) indicates that high density renal lesions of greater than 70 HU in density can be considered benign complex cysts, and no further imaging workup of   these lesions is recommended  Small hypodense lesions in the kidneys, too small to characterize      STOMACH AND BOWEL:  Colonic diverticulosis without evidence of acute diverticulitis  Large amount fecal material within the colon is seen      APPENDIX:  Not seen     ABDOMINOPELVIC CAVITY:  No ascites  No pneumoperitoneum  No lymphadenopathy      VESSELS:  Atherosclerotic changes are present    No evidence of aneurysm      PELVIS     REPRODUCTIVE ORGANS:  The prostate is enlarged      URINARY BLADDER:  Unremarkable      ABDOMINAL WALL/INGUINAL REGIONS:  Unremarkable      OSSEOUS STRUCTURES:  Degenerative changes in the spine are seen  Status post right ORIF of the femur     IMPRESSION:     Colonic diverticulosis without evidence of acute diverticulitis        Incidental Findings:   ·      Test Results Pending at Discharge (will require follow up):   ·      Outpatient Tests Requested:  ·     Complications:      Reason for Admission:  Intractable nausea, vomiting for 2 days    Hospital Course:     Duane Homer is a 79 y o  male patient who originally presented to the hospital on 1/11/2021 due to intractable nausea vomiting without fevers chills or abdominal pain  On presentation vitals were stable, he was managed conservatively with IV fluids and he improved  Chest x-ray showed no acute cardiopulmonary findings and CT scan abdomen showed diverticulosis without diverticulitis  Patient's symptoms resolved overnight and he is requesting to be discharged this morning    Please see above list of diagnoses and related plan for additional information  Condition at Discharge: stable     Discharge Day Visit / Exam:     Subjective:  No overnight events, nausea, vomiting improved, denies abdominal pain  Vitals: Blood Pressure: 161/80 (01/12/21 0230)  Pulse: 71 (01/12/21 0230)  Temperature: (!) 97 1 °F (36 2 °C) (01/11/21 1828)  Temp Source: Tympanic (01/11/21 1828)  Respirations: (!) 35 (01/12/21 0230)  Height: 5' 7" (170 2 cm) (01/11/21 1824)  Weight - Scale: 49 9 kg (110 lb) (01/11/21 1824)  SpO2: 95 % (01/12/21 0230)  Exam:   Physical Exam  Vitals signs and nursing note reviewed  Constitutional:       General: He is not in acute distress  Appearance: Normal appearance  He is not ill-appearing or toxic-appearing  Cardiovascular:      Rate and Rhythm: Normal rate and regular rhythm  Pulses: Normal pulses  Heart sounds: No murmur  No gallop  Pulmonary:      Effort: Pulmonary effort is normal  No respiratory distress        Breath sounds: Normal breath sounds  No wheezing or rales  Chest:      Chest wall: No tenderness  Abdominal:      General: Bowel sounds are normal  There is no distension  Palpations: Abdomen is soft  Tenderness: There is no abdominal tenderness  There is no right CVA tenderness, left CVA tenderness or guarding  Musculoskeletal: Normal range of motion  General: Deformity (left BKA) present  No swelling  Right lower leg: No edema  Left lower leg: No edema  Skin:     General: Skin is warm and dry  Capillary Refill: Capillary refill takes less than 2 seconds  Coloration: Skin is not jaundiced  Findings: No bruising, erythema, lesion or rash  Neurological:      General: No focal deficit present  Mental Status: He is alert  Mental status is at baseline  Cranial Nerves: No cranial nerve deficit  Psychiatric:         Thought Content: Thought content normal          Judgment: Judgment normal      Discussion with Family: I updated patient's daughter    Discharge instructions/Information to patient and family:   See after visit summary for information provided to patient and family  Provisions for Follow-Up Care:  See after visit summary for information related to follow-up care and any pertinent home health orders  Disposition:     Home    For Discharges to [facility]:   · Not Applicable to this Patient - Not Applicable to this Patient    Planned Readmission: no     Discharge Statement:  I spent 45 minutes discharging the patient  This time was spent on the day of discharge  I had direct contact with the patient on the day of discharge  Greater than 50% of the total time was spent examining patient, answering all patient questions, arranging and discussing plan of care with patient as well as directly providing post-discharge instructions  Additional time then spent on discharge activities      Discharge Medications:  See after visit summary for reconciled discharge medications provided to patient and family        ** Please Note: This note has been constructed using a voice recognition system **

## 2021-01-12 NOTE — ED CARE HANDOFF
Emergency Department Sign Out Note        Sign out and transfer of care from Dr Zuly Busby  See Separate Emergency Department note  The patient, Agustin Juan, was evaluated by the previous provider for N/V  Workup Completed:  CT abdomen pelvis wo contrast   Final Result      Colonic diverticulosis without evidence of acute diverticulitis  Workstation performed: [id number]         XR chest 1 view portable   ED Interpretation   Abnormal   No acute infiltrate or pneumothorax  ?  Thoracic aortic dilatation         Labs Reviewed   CBC AND DIFFERENTIAL - Abnormal       Result Value Ref Range Status    WBC 7 47  4 31 - 10 16 Thousand/uL Final    RBC 4 97  3 88 - 5 62 Million/uL Final    Hemoglobin 16 4  12 0 - 17 0 g/dL Final    Hematocrit 49 3  36 5 - 49 3 % Final    MCV 99 (*) 82 - 98 fL Final    MCH 33 0  26 8 - 34 3 pg Final    MCHC 33 3  31 4 - 37 4 g/dL Final    RDW 12 5  11 6 - 15 1 % Final    MPV 10 4  8 9 - 12 7 fL Final    Platelets 743  433 - 390 Thousands/uL Final    nRBC 0  /100 WBCs Final    Neutrophils Relative 83 (*) 43 - 75 % Final    Immat GRANS % 0  0 - 2 % Final    Lymphocytes Relative 15  14 - 44 % Final    Monocytes Relative 2 (*) 4 - 12 % Final    Eosinophils Relative 0  0 - 6 % Final    Basophils Relative 0  0 - 1 % Final    Neutrophils Absolute 6 15  1 85 - 7 62 Thousands/µL Final    Immature Grans Absolute 0 03  0 00 - 0 20 Thousand/uL Final    Lymphocytes Absolute 1 10  0 60 - 4 47 Thousands/µL Final    Monocytes Absolute 0 16 (*) 0 17 - 1 22 Thousand/µL Final    Eosinophils Absolute 0 00  0 00 - 0 61 Thousand/µL Final    Basophils Absolute 0 03  0 00 - 0 10 Thousands/µL Final   COMPREHENSIVE METABOLIC PANEL - Abnormal    Sodium 141  136 - 145 mmol/L Final    Potassium 4 3  3 5 - 5 3 mmol/L Final    Chloride 102  100 - 108 mmol/L Final    CO2 27  21 - 32 mmol/L Final    ANION GAP 12  4 - 13 mmol/L Final    BUN 19  5 - 25 mg/dL Final    Creatinine 1 16  0 60 - 1 30 mg/dL Final    Comment: Standardized to IDMS reference method    Glucose 143 (*) 65 - 140 mg/dL Final    Comment: 18  If the patient is fasting, the ADA then defines impaired fasting glucose as > 100 mg/dL and diabetes as > or equal to 123 mg/dL  Specimen collection should occur prior to Sulfasalazine administration due to the potential for falsely depressed results  Specimen collection should occur prior to Sulfapyridine administration due to the potential for falsely elevated results  Calcium 9 8  8 3 - 10 1 mg/dL Final    AST 23  5 - 45 U/L Final    Comment: Specimen collection should occur prior to Sulfasalazine administration due to the potential for falsely depressed results  ALT 18  12 - 78 U/L Final    Comment: Specimen collection should occur prior to Sulfasalazine administration due to the potential for falsely depressed results  Alkaline Phosphatase 128 (*) 46 - 116 U/L Final    Total Protein 7 5  6 4 - 8 2 g/dL Final    Albumin 3 9  3 5 - 5 0 g/dL Final    Total Bilirubin 0 90  0 20 - 1 00 mg/dL Final    Comment: Use of this assay is not recommended for patients undergoing treatment with eltrombopag due to the potential for falsely elevated results      eGFR 59  ml/min/1 73sq m Final    Narrative:     [place] guidelines for Chronic Kidney Disease (CKD):     Stage 1 with normal or high GFR (GFR > 90 mL/min/1 73 square meters)    Stage 2 Mild CKD (GFR = 60-89 mL/min/1 73 square meters)    Stage 3A Moderate CKD (GFR = 45-59 mL/min/1 73 square meters)    Stage 3B Moderate CKD (GFR = 30-44 mL/min/1 73 square meters)    Stage 4 Severe CKD (GFR = 15-29 mL/min/1 73 square meters)    Stage 5 End Stage CKD (GFR <15 mL/min/1 73 square meters)  Note: GFR calculation is accurate only with a steady state creatinine   LIPASE - Abnormal    Lipase 61 (*) 73 - 393 u/L Final   COVID19, INFLUENZA A/B, RSV PCR, [facility]        ED Course / Workup Pending (followup):                                    ED Course as of Jan 11 2309 Mon Jan 11, 2021   2208 S/o from Dr Eliel OWENS pend  Will PO challenge and dispo  May require admission  2252 Pt vomiting despite repeat doses of zofran  Reglan ordered  Concerned he is aspirating  No white count or fever  Will start O2 and admit  Procedures  MDM  Number of Diagnoses or Management Options  Aspiration pneumonitis [facility]): new and requires workup  Nausea & vomiting: new and requires workup     Amount and/or Complexity of Data Reviewed  Clinical lab tests: reviewed  Tests in the radiology section of CPT®: reviewed  Independent visualization of images, tracings, or specimens: yes    Risk of Complications, Morbidity, and/or Mortality  Presenting problems: moderate  Diagnostic procedures: low  Management options: low    Patient Progress  Patient progress: stable      Disposition  Final diagnoses:   Nausea & vomiting   Aspiration pneumonitis ([address] )     Time reflects when diagnosis was documented in both MDM as applicable and the Disposition within this note     Time User Action Codes Description Comment    1/11/2021 11:09 PM Agata STOVALL Add [R11 2] Nausea & vomiting     1/11/2021 11:09 PM Agata Ann S Add [J69 0] Aspiration pneumonitis [facility])       ED Disposition     ED Disposition Condition Date/Time Comment    Admit Stable Mon Jan 11, 2021 11:08 PM Case was discussed with Margareth Aguilar and the patient's admission status was agreed to be Admission Status: inpatient status to the service of Dr Fanta Ochoa   Follow-up Information    None       Patient's Medications   Discharge Prescriptions    No medications on file     No discharge procedures on file         ED Provider  Electronically Signed by     Chon Alcaraz MD  01/11/21 0544

## 2021-01-12 NOTE — ASSESSMENT & PLAN NOTE
· Came into ED by EMS  Reported to have been vomitting since 1/10  Has not been able to keep any food and minimal fluids down  Given zofran and metoclompramide in the ED  Continues to have nausea  Has vomited twice while in the ED  Slight discomfort in the abdomen  · CT Abdo pelvis: Colonic diverticulosis without evidence of acute diverticulitis    · Continue zofran and reglan as needed for N/V  · Started on gentle fluids   · Electrolytes K+ and sodium stable at this time  · Continue to monitor   · CM consulted

## 2021-01-12 NOTE — ASSESSMENT & PLAN NOTE
· Amputation done in 2019 after patient had a blood clot left untreated in the leg  · Patient uses a prosthetic leg and sees physical therapy 4x a week outpatient  · Patient did not bring prosthetic to the hospital, unable to ambulate without

## 2021-01-12 NOTE — UTILIZATION REVIEW
Initial Clinical Review    Admission: Date/Time/Statement:   Admission Orders (From admission, onward)     Ordered        01/11/21 2309  Inpatient Admission  Once                   Orders Placed This Encounter   Procedures    Inpatient Admission     Standing Status:   Standing     Number of Occurrences:   1     Order Specific Question:   Admitting Physician     Answer:   Massimo Hu [[id number]]     Order Specific Question:   Level of Care     Answer:   Med Surg [[id number]]     Order Specific Question:   Estimated length of stay     Answer:   More than 2 Midnights     Order Specific Question:   Certification     Answer:   I certify that inpatient services are medically necessary for this patient for a duration of greater than two midnights  See H&P and MD Progress Notes for additional information about the patient's course of treatment  ED Arrival Information     Expected Arrival Acuity Means of Arrival Escorted By Service Admission Type    - 1/11/2021 18:27 Urgent Ambulance [facility] Medicine Urgent    Arrival Complaint    -        Chief Complaint   Patient presents with    Vomiting     patient presents to the ED via EMS with c/o nausea nd vomiting today, recieved zofran in route and feels better      Assessment/Plan: this is a 79year old male from home to ED admitted inpatient due to intractable vomiting with nausea  Presented due to worsening nausea and vomiting with abdominal discomfort, cough started today  Ems administered Zofran  On exam right and left eye with discharge  Abdominal tenderness  Ct abdomen without acute findings  In the ED given Zofran 4 mg IV  Reglan 5 mg IV  Started on IVF, placed on oxygen 1 liter, uses home oxygen HS of 3 liters     Plan is continued IVF, nausea control, start erythromycin ophthalmic  1/12/2021 nausea and vomiting resolved  IVF in progress       ED Triage Vitals   Temperature Pulse Respirations Blood Pressure SpO2   01/11/21 1828 01/11/21 1828 01/11/21 1828 01/11/21 1828 01/11/21 1828   (!) 97 1 °F (36 2 °C) 82 20 163/86 95 %      Temp Source Heart Rate Source Patient Position - Orthostatic VS BP Location FiO2 (%)   01/11/21 1828 01/11/21 1828 01/11/21 1830 01/11/21 1830 --   Tympanic Monitor Lying Left arm       Pain Score       --                 Wt Readings from Last 1 Encounters:   01/11/21 49 9 kg (110 lb)     Additional Vital Signs:   01/12/21 0230    71  35Abnormal   161/80  114  95 %       01/12/21 0130    73  26Abnormal   159/78  111         01/12/21 0030    71  31Abnormal   163/76  109  99 %       01/12/21 0015    76  26Abnormal       98 %       01/12/21 0000    73  31Abnormal   178/85Abnormal   122  98 %       01/11/21 2300    78  21  164/77  111  91 %       01/11/21 2230    83  26Abnormal   193/105Abnormal   139  92 %       01/11/21 2200    81  24Abnormal   180/95Abnormal   132  92 %           Pertinent Labs/Diagnostic Test Results:   1/11/2021 CxR - No acute cardiopulmonary disease  Tortuous thoracic aorta  1/11/2021 ct abdomen Colonic diverticulosis without evidence of acute diverticulitis      1/11/2021 EKG Normal sinus rhythm with sinus arrhythmia  Normal ECG  When compared with ECG of 29-OCT-2014 18:34,  No significant change was found  Results from last 7 days   Lab Units 01/11/21 2215   SARS-COV-2  Negative     Results from last 7 days   Lab Units 01/12/21  0548 01/11/21  1830   WBC Thousand/uL 9 01 7 47   HEMOGLOBIN g/dL 13 7 16 4   HEMATOCRIT % 41 4 49 3   PLATELETS Thousands/uL 206 252   NEUTROS ABS Thousands/µL 6 27 6 15     Results from last 7 days   Lab Units 01/12/21  0548 01/11/21  1830   SODIUM mmol/L 141 141   POTASSIUM mmol/L 3 6 4 3   CHLORIDE mmol/L 105 102   CO2 mmol/L 27 27   ANION GAP mmol/L 9 12   BUN mg/dL 21 19   CREATININE mg/dL 0 99 1 16   EGFR ml/min/1 73sq m 71 59   CALCIUM mg/dL 8 7 9 8     Results from last 7 days   Lab Units 01/11/21  1830   AST U/L 23   ALT U/L 18   ALK PHOS U/L 128* TOTAL PROTEIN g/dL 7 5   ALBUMIN g/dL 3 9   TOTAL BILIRUBIN mg/dL 0 90     Results from last 7 days   Lab Units 01/12/21  0548 01/11/21  1830   GLUCOSE RANDOM mg/dL 102 143*     Results from last 7 days   Lab Units 01/11/21  1830   LIPASE u/L 61*     Results from last 7 days   Lab Units 01/11/21  2215   INFLUENZA A PCR  Negative   INFLUENZA B PCR  Negative   RSV PCR  Negative         ED Treatment:   Medication Administration from 01/11/2021 1823 to 01/12/2021 1616       Date/Time Order Dose Route Action Comments     01/11/2021 2216 ondansetron (ZOFRAN) injection 4 mg 4 mg Intravenous Given      01/11/2021 2307 metoclopramide (REGLAN) injection 5 mg 5 mg Intravenous Given      01/12/2021 0844 aspirin (ECOTRIN LOW STRENGTH) EC tablet 81 mg 81 mg Oral Given      01/12/2021 0844 chlorthalidone tablet 25 mg 25 mg Oral Given      01/12/2021 0844 cilostazol (PLETAL) tablet 100 mg 100 mg Oral Given      01/12/2021 0843 nicotine (NICODERM CQ) 21 mg/24 hr TD 24 hr patch 1 patch 1 patch Transdermal Medication Applied      01/12/2021 0843 enoxaparin (LOVENOX) subcutaneous injection 40 mg 40 mg Subcutaneous Given      01/12/2021 0156 sodium chloride 0 9 % infusion 75 mL/hr Intravenous New Bag      01/12/2021 0844 erythromycin (ILOTYCIN) 0 5 % ophthalmic ointment 0 5 inch 0 5 inch Both Eyes Given         Past Medical History:   Diagnosis Date    COPD (chronic obstructive pulmonary disease) ([facility] [address] [facility] )     MI, old      Present on Admission:  **None**      Admitting Diagnosis: Vomiting [R11 10]  Age/Sex: 79 y o  male  Admission Orders: 1/11/2021 2309 inpatient   Scheduled Medications:  Medication    erythromycin (ILOTYCIN) 0 5 % ophthalmic ointment 0 5 inch every 8 hours both eyes     sodium chloride 0 9 % infusion IV at 75 cc hour    enoxaparin (LOVENOX) subcutaneous injection 40 mg sc daily    nicotine (NICODERM CQ) 21 mg/24 hr TD 24 hr patch 1 patch TD daily    oxyCODONE (ROXICODONE) IR tablet 5 mg po every 8 hours       mirtazapine (REMERON) tablet 30 mg po at bedtime   cilostazol (PLETAL) tablet 100 mg po BID    chlorthalidone tablet 25 mg po daily    aspirin (ECOTRIN LOW STRENGTH) EC tablet 81 mg po daily     Network Utilization Review Department  ATTENTION: Please call with any questions or concerns to [phone] and carefully listen to the prompts so that you are directed to the right person  All voicemails are confidential   Greer Whaley all requests for admission clinical reviews, approved or denied determinations and any other requests to dedicated fax number below belonging to the campus where the patient is receiving treatment   List of dedicated fax numbers for the Facilities:  [address] DENIALS (Administrative/Medical Necessity) [phone]   [address] (Maternity/NICU/Pediatrics) [phone]   [address] [place] [address] [address] Zak Francois [address] ([address]) [address] [place] [address] [address] [place] [address] [phone]

## 2021-01-12 NOTE — ED PROVIDER NOTES
History  Chief Complaint   Patient presents with    Vomiting     patient presents to the ED with continuous vomitng, just d/c this morning, daughter also states patient has increased depression and would like a psych consult, patient denies SI    Depression     76-year-old male represents to the emergency department for further vomiting  The patient was seen in the emergency department last evening, admitted and subsequently discharged around noon  The patient admits to nausea and vomiting at home  He states that he cannot keep liquids down  The patient states that he is hungry and only has mild abdominal discomfort when vomiting  The patient also states that he has been depressed for the past 6 years and has worse recently  The patient denies any suicidal homicidal ideation  The patient states that he lives in the basement of his daughter's residence  Prior to Admission Medications   Prescriptions Last Dose Informant Patient Reported? Taking?   aspirin (ECOTRIN LOW STRENGTH) 81 mg EC tablet   Yes No   Sig: Take 81 mg by mouth daily   chlorthalidone 25 mg tablet   Yes No   Sig: Take 25 mg by mouth daily   cilostazol (PLETAL) 100 mg tablet   Yes No   Sig: Take 100 mg by mouth 2 (two) times a day   erythromycin (ILOTYCIN) ophthalmic ointment   No No   Sig: Administer 0 5 inches to both eyes every 8 (eight) hours   mirtazapine (REMERON) 30 mg tablet   Yes No   Sig: Take 30 mg by mouth daily at bedtime   oxyCODONE (OXY-IR) 5 MG capsule   Yes No   Sig: Take 5 mg by mouth every 8 (eight) hours   predniSONE 10 mg tablet   Yes No   Sig: Take by mouth daily      Facility-Administered Medications: None       Past Medical History:   Diagnosis Date    COPD (chronic obstructive pulmonary disease) (HCC)     MI, old        Past Surgical History:   Procedure Laterality Date    BELOW KNEE LEG AMPUTATION      CARDIAC SURGERY      HERNIA REPAIR         History reviewed  No pertinent family history    I have reviewed and agree with the history as documented  E-Cigarette/Vaping     E-Cigarette/Vaping Substances     Social History     Tobacco Use    Smoking status: Current Every Day Smoker     Packs/day: 1 00     Types: Cigarettes    Smokeless tobacco: Never Used   Substance Use Topics    Alcohol use: Yes     Comment: socially     Drug use: Not Currently       Review of Systems   Constitutional: Negative for chills, fatigue and fever  HENT: Negative for sore throat  Respiratory: Negative for cough, chest tightness and shortness of breath  Cardiovascular: Negative for chest pain and palpitations  Gastrointestinal: Positive for abdominal pain, nausea and vomiting  Negative for constipation and diarrhea  Genitourinary: Negative for difficulty urinating and dysuria  Musculoskeletal: Negative for back pain  Skin: Negative for rash  Neurological: Negative for dizziness, seizures, syncope, weakness and headaches  Psychiatric/Behavioral: Positive for dysphoric mood  Negative for suicidal ideas  All other systems reviewed and are negative  Physical Exam  Physical Exam  Vitals signs and nursing note reviewed  Constitutional:       General: He is not in acute distress  Appearance: He is well-developed  HENT:      Head: Normocephalic and atraumatic  Right Ear: External ear normal       Left Ear: External ear normal       Mouth/Throat:      Pharynx: No oropharyngeal exudate  Eyes:      General: No scleral icterus  Pupils: Pupils are equal, round, and reactive to light  Neck:      Musculoskeletal: Normal range of motion and neck supple  Cardiovascular:      Rate and Rhythm: Normal rate and regular rhythm  Heart sounds: Normal heart sounds  Pulmonary:      Effort: Pulmonary effort is normal  No respiratory distress  Breath sounds: Normal breath sounds  Abdominal:      General: Bowel sounds are normal       Palpations: Abdomen is soft  Tenderness:  There is no abdominal tenderness  There is no guarding or rebound  Musculoskeletal: Normal range of motion  Comments: Left BKA   Skin:     General: Skin is warm and dry  Findings: No rash  Neurological:      Mental Status: He is alert and oriented to person, place, and time  Psychiatric:         Mood and Affect: Mood is depressed  Mood is not anxious  Thought Content: Thought content does not include suicidal ideation           Vital Signs  ED Triage Vitals   Temperature Pulse Respirations Blood Pressure SpO2   01/12/21 1605 01/12/21 1605 01/12/21 1605 01/12/21 1609 01/12/21 1605   (!) 97 4 °F (36 3 °C) 102 20 126/75 97 %      Temp Source Heart Rate Source Patient Position - Orthostatic VS BP Location FiO2 (%)   01/12/21 1605 01/12/21 1605 01/12/21 1605 01/12/21 1605 --   Temporal Monitor Sitting Left arm       Pain Score       --                  Vitals:    01/12/21 1830 01/12/21 1900 01/12/21 2030 01/12/21 2100   BP: 135/64 122/62 131/70 141/67   Pulse: 71 72 77 74   Patient Position - Orthostatic VS:  Sitting Lying Sitting         Visual Acuity      ED Medications  Medications   ondansetron (ZOFRAN-ODT) dispersible tablet 4 mg (4 mg Oral Given 1/12/21 1702)       Diagnostic Studies  Results Reviewed     Procedure Component Value Units Date/Time    Comprehensive metabolic panel [[id number]]  (Abnormal) Collected: 01/12/21 1722    Lab Status: Final result Specimen: Blood from Arm, Left Updated: 01/12/21 1752     Sodium 137 mmol/L      Potassium 3 7 mmol/L      Chloride 102 mmol/L      CO2 28 mmol/L      ANION GAP 7 mmol/L      BUN 21 mg/dL      Creatinine 1 13 mg/dL      Glucose 109 mg/dL      Calcium 9 3 mg/dL      Corrected Calcium 9 8 mg/dL      AST 40 U/L      ALT 25 U/L      Alkaline Phosphatase 110 U/L      Total Protein 7 1 g/dL      Albumin 3 4 g/dL      Total Bilirubin 1 00 mg/dL      eGFR 61 ml/min/1 73sq m     Narrative:      [place] guidelines for Chronic Kidney Disease (CKD):     Stage 1 with normal or high GFR (GFR > 90 mL/min/1 73 square meters)    Stage 2 Mild CKD (GFR = 60-89 mL/min/1 73 square meters)    Stage 3A Moderate CKD (GFR = 45-59 mL/min/1 73 square meters)    Stage 3B Moderate CKD (GFR = 30-44 mL/min/1 73 square meters)    Stage 4 Severe CKD (GFR = 15-29 mL/min/1 73 square meters)    Stage 5 End Stage CKD (GFR <15 mL/min/1 73 square meters)  Note: GFR calculation is accurate only with a steady state creatinine    CBC and differential [[id number]]  (Abnormal) Collected: 01/12/21 1722    Lab Status: Final result Specimen: Blood from Arm, Left Updated: 01/12/21 1736     WBC 10 72 Thousand/uL      RBC 4 60 Million/uL      Hemoglobin 15 1 g/dL      Hematocrit 46 8 %       fL      MCH 32 8 pg      MCHC 32 3 g/dL      RDW 12 7 %      MPV 10 1 fL      Platelets 418 Thousands/uL      nRBC 0 /100 WBCs      Neutrophils Relative 72 %      Immat GRANS % 1 %      Lymphocytes Relative 17 %      Monocytes Relative 10 %      Eosinophils Relative 0 %      Basophils Relative 0 %      Neutrophils Absolute 7 81 Thousands/µL      Immature Grans Absolute 0 05 Thousand/uL      Lymphocytes Absolute 1 77 Thousands/µL      Monocytes Absolute 1 05 Thousand/µL      Eosinophils Absolute 0 01 Thousand/µL      Basophils Absolute 0 03 Thousands/µL                  No orders to display              Procedures  Procedures         ED Course  ED Course as of Jan 12 2222   Tue Jan 12, 2021   1707 I discussed the case with Dr Matthew Restrepo and reviewed the the events from this morning in her discussion with the daughter  The plan is to recheck laboratories, have ED crisis talk with the patient as well as a trial as Zofran a p o  Challenge  2038 Patient consulted with ED crisis, no acute indication for mental health hospitalization  The patient tolerating oral liquids and solids  There are no signs of dehydration  I discussed the plan for discharge    The patient is agreeable with this                                              MDM  Number of Diagnoses or Management Options  Anxiety and depression:   Nausea and vomiting:   Diagnosis management comments: The plan is for Zofran ODT, checking labs to ensure there is no acute dehydration electrolyte disturbance then oral challenge  Patient will also have crisis evaluate    The patient (and any family present) verbalized understanding of the discharge instructions and warnings that would necessitate return to the Emergency Department  All questions were answered prior to discharge  Amount and/or Complexity of Data Reviewed  Clinical lab tests: reviewed  Decide to obtain previous medical records or to obtain history from someone other than the patient: yes        Disposition  Final diagnoses:   Nausea and vomiting   Anxiety and depression     Time reflects when diagnosis was documented in both MDM as applicable and the Disposition within this note     Time User Action Codes Description Comment    1/12/2021  8:42 PM Janny Kaplan Add [R11 2] Nausea and vomiting     1/12/2021  8:43 PM Janny Kaplan Add [F41 9,  F32 9] Anxiety and depression       ED Disposition     ED Disposition Condition Date/Time Comment    Discharge Stable Tue Jan 12, 2021  8:42 PM Laci Ram discharge to home/self care              MD Documentation      Most Recent Value   Sending MD  Dr Ortiz Knows up With Specialties Details Why Contact Info    your family doctor  Schedule an appointment as soon as possible for a visit  For further evaluation           Discharge Medication List as of 1/12/2021  8:44 PM      START taking these medications    Details   ondansetron (ZOFRAN-ODT) 4 mg disintegrating tablet Take 1 tablet (4 mg total) by mouth every 6 (six) hours as needed for nausea or vomiting, Starting Tue 1/12/2021, Normal         CONTINUE these medications which have NOT CHANGED    Details   aspirin ([address]) 81 mg EC tablet Take 81 mg by mouth daily, Historical Med      chlorthalidone 25 mg tablet Take 25 mg by mouth daily, Historical Med      cilostazol (PLETAL) 100 mg tablet Take 100 mg by mouth 2 (two) times a day, Historical Med      erythromycin (ILOTYCIN) ophthalmic ointment Administer 0 5 inches to both eyes every 8 (eight) hours, Starting Tue 1/12/2021, Normal      mirtazapine (REMERON) 30 mg tablet Take 30 mg by mouth daily at bedtime, Historical Med      oxyCODONE (OXY-IR) 5 MG capsule Take 5 mg by mouth every 8 (eight) hours, Historical Med      predniSONE 10 mg tablet Take by mouth daily, Historical Med           No discharge procedures on file      PDMP Review     None          ED Provider  Electronically Signed by           Artem Rosa DO  01/12/21 5443

## 2021-01-12 NOTE — DISCHARGE INSTRUCTIONS

## 2021-01-12 NOTE — ASSESSMENT & PLAN NOTE
Malnutrition Findings:           BMI Findings: Body mass index is 17 23 kg/m²     · Consult nutrition

## 2021-01-13 NOTE — ED NOTES
Pt is a 79 y o  male who was brought to the ED for physical complaints and worsening depression  Patient reports worsening depression over this past year due to various medical issues he has faced  Patient has tried to ignore his feelings, but they keep progressing and so he told his daughter about it  Patient expressed that he has been more isolated recently after getting his leg amputated  He has been struggling to adjust to the changes and feels like he can't just get around as easily as he could before  Patient denies any suicidal ideations, but does admit to occassional passive thoughts of wanting to die due to his medical issues  Patient denies any prior suicide attempts  Patient denies homicidal ideations and auditory/visual hallucinations  Patient has no prior MH treatment  Patient denies any changes in sleep or appetite  Patient is safe at home living with his daughter and her family  Outpatient treatment was discussed, and although patient seems reluctant, he was agreeable to receive outpatient resources and discuss further with his daughter  Patient does confirm that his daughter has been encouraging him to speak with someone      Chief Complaint   Patient presents with    Vomiting     patient presents to the ED with continuous vomitng, just d/c this morning, daughter also states patient has increased depression and would like a psych consult, patient denies SI    Depression     Intake Assessment completed, Safety risk Assessment completed    GREG Watson  01/12/21 2050

## 2021-01-13 NOTE — ED NOTES
Pt ate all of dinner tray  Pt sleeping  resp unlabored   Awaiting crisis     Nidia Kimble RN  01/12/21 1921

## 2021-01-13 NOTE — ED NOTES
Pt d/c'd  Daughter (Nickie) called per pt for transport home   Reached her voicemail and message left per pt request  Pt is also trying to reach his daughter via his [place] [id number], [address] [facility]  01/12/21 2055

## 2021-01-13 NOTE — ED NOTES
[place] indicates:  Inactive  Primary payor is Medicare  Patient is not enrolled in a managed plan

## 2021-01-28 NOTE — PHYSICIAN ADVISOR
Current patient class: Inpatient  The patient is currently on Hospital Day: 2 at [place]      The patient was admitted to the hospital  on 1/11/21 at 2309 for the following diagnosis:  Vomiting [R11 10]     After review of the relevant documentation, labs, vital signs and test results, this is a PROVIDER LIABLE case  In this particular case the patient was admitted to the hospital as an inpatient  The patient however failed to satisfy the 2 midnight benchmark and closer scrutiny of the case was warranted  After review of the patient presentation and relevant labs the patient was most appropriate for observation or outpatient class at the time of admission  Given that this patient has already been discharged prior to this review they become a provider liable case  Rationale is as follows: The patient is a 79 yrs   Male who presented to the ED at 1/11/2021  6:27 PM with a chief complaint of Vomiting (patient presents to the ED via EMS with c/o nausea nd vomiting today, recieved zofran in route and feels better )   Patient was discharged the next day and just receive some IV fluids  Patient only crossed 1 midnight in the hospital   There is no documentation of unexpected early recovery  Patient was most appropriate for observation status on admission  This case is provider liable      The patients vitals on arrival were   ED Triage Vitals   Temperature Pulse Respirations Blood Pressure SpO2   01/11/21 1828 01/11/21 1828 01/11/21 1828 01/11/21 1828 01/11/21 1828   (!) 97 1 °F (36 2 °C) 82 20 163/86 95 %      Temp Source Heart Rate Source Patient Position - Orthostatic VS BP Location FiO2 (%)   01/11/21 1828 01/11/21 1828 01/11/21 1830 01/11/21 1830 --   Tympanic Monitor Lying Left arm       Pain Score       --                  Past Medical History:   Diagnosis Date    COPD (chronic obstructive pulmonary disease) (HCC)     MI, old      Past Surgical History: Procedure Laterality Date    BELOW KNEE LEG AMPUTATION      CARDIAC SURGERY      HERNIA REPAIR             Consults have been placed to:   None    Vitals:    01/12/21 0015 01/12/21 0030 01/12/21 0130 01/12/21 0230   BP:  163/76 159/78 161/80   Pulse: 76 71 73 71   Resp: (!) 26 (!) 31 (!) 26 (!) 35   Temp:       TempSrc:       SpO2: 98% 99%  95%   Weight:       Height:           Most recent labs:    No results for input(s): WBC, HGB, HCT, PLT, K, NA, CALCIUM, BUN, CREATININE, LIPASE, AMYLASE, INR, TROPONINI, CKTOTAL, AST, ALT, ALKPHOS, BILITOT in the last 72 hours  Scheduled Meds:  Continuous Infusions:No current facility-administered medications for this encounter  PRN Meds:      Surgical procedures (if appropriate):

## 2021-05-04 ENCOUNTER — APPOINTMENT (INPATIENT)
Dept: RADIOLOGY | Facility: HOSPITAL | Age: 81
DRG: 871 | End: 2021-05-04
Attending: INTERNAL MEDICINE
Payer: MEDICARE

## 2021-05-04 ENCOUNTER — APPOINTMENT (EMERGENCY)
Dept: CT IMAGING | Facility: HOSPITAL | Age: 81
DRG: 871 | End: 2021-05-04
Payer: MEDICARE

## 2021-05-04 ENCOUNTER — HOSPITAL ENCOUNTER (INPATIENT)
Facility: HOSPITAL | Age: 81
LOS: 3 days | Discharge: HOME HEALTH | DRG: 871 | End: 2021-05-07
Attending: EMERGENCY MEDICINE | Admitting: INTERNAL MEDICINE
Payer: MEDICARE

## 2021-05-04 DIAGNOSIS — U07.1 COVID-19: ICD-10-CM

## 2021-05-04 DIAGNOSIS — I48.0 PAROXYSMAL ATRIAL FIBRILLATION (HCC): ICD-10-CM

## 2021-05-04 DIAGNOSIS — I48.91 NEW ONSET ATRIAL FIBRILLATION (HCC): ICD-10-CM

## 2021-05-04 DIAGNOSIS — I63.9 STROKE (CEREBRUM) (HCC): Primary | ICD-10-CM

## 2021-05-04 DIAGNOSIS — R77.8 ELEVATED TROPONIN: ICD-10-CM

## 2021-05-04 PROBLEM — A41.89 SEPSIS DUE TO COVID-19 (HCC): Status: ACTIVE | Noted: 2021-05-04

## 2021-05-04 PROBLEM — R47.1 DYSARTHRIA: Status: ACTIVE | Noted: 2021-05-04

## 2021-05-04 PROBLEM — J96.01 ACUTE RESPIRATORY FAILURE WITH HYPOXIA (HCC): Status: ACTIVE | Noted: 2021-05-04

## 2021-05-04 PROBLEM — Z20.822 CLOSE EXPOSURE TO COVID-19 VIRUS: Status: ACTIVE | Noted: 2021-05-04

## 2021-05-04 PROBLEM — R29.90 STROKE-LIKE SYMPTOMS: Status: ACTIVE | Noted: 2021-05-04

## 2021-05-04 PROBLEM — I73.9 PAD (PERIPHERAL ARTERY DISEASE) (HCC): Status: ACTIVE | Noted: 2021-05-04

## 2021-05-04 LAB
ABO GROUP BLD: NORMAL
ANION GAP SERPL CALCULATED.3IONS-SCNC: 10 MMOL/L (ref 4–13)
APTT PPP: 126 SECONDS (ref 23–37)
APTT PPP: 36 SECONDS (ref 23–37)
ATRIAL RATE: 127 BPM
ATRIAL RATE: 84 BPM
BUN SERPL-MCNC: 21 MG/DL (ref 5–25)
CALCIUM SERPL-MCNC: 9 MG/DL (ref 8.3–10.1)
CHLORIDE SERPL-SCNC: 102 MMOL/L (ref 100–108)
CK SERPL-CCNC: 139 U/L (ref 39–308)
CO2 SERPL-SCNC: 29 MMOL/L (ref 21–32)
CREAT SERPL-MCNC: 1.23 MG/DL (ref 0.6–1.3)
CRP SERPL QL: 70.6 MG/L
D DIMER PPP FEU-MCNC: 3.15 UG/ML FEU
ERYTHROCYTE [DISTWIDTH] IN BLOOD BY AUTOMATED COUNT: 14.3 % (ref 11.6–15.1)
FERRITIN SERPL-MCNC: 592 NG/ML (ref 8–388)
GFR SERPL CREATININE-BSD FRML MDRD: 55 ML/MIN/1.73SQ M
GLUCOSE SERPL-MCNC: 87 MG/DL (ref 65–140)
GLUCOSE SERPL-MCNC: 90 MG/DL (ref 65–140)
HCT VFR BLD AUTO: 40.6 % (ref 36.5–49.3)
HGB BLD-MCNC: 13.7 G/DL (ref 12–17)
HIV 1+2 AB+HIV1 P24 AG SERPL QL IA: NORMAL
HIV1 P24 AG SER QL: NORMAL
INR PPP: 0.95 (ref 0.84–1.19)
MCH RBC QN AUTO: 32.2 PG (ref 26.8–34.3)
MCHC RBC AUTO-ENTMCNC: 33.7 G/DL (ref 31.4–37.4)
MCV RBC AUTO: 96 FL (ref 82–98)
NT-PROBNP SERPL-MCNC: 4709 PG/ML
P AXIS: 68 DEGREES
PLATELET # BLD AUTO: 169 THOUSANDS/UL (ref 149–390)
PMV BLD AUTO: 9.9 FL (ref 8.9–12.7)
POTASSIUM SERPL-SCNC: 3.5 MMOL/L (ref 3.5–5.3)
PR INTERVAL: 160 MS
PROCALCITONIN SERPL-MCNC: <0.05 NG/ML
PROTHROMBIN TIME: 12.7 SECONDS (ref 11.6–14.5)
QRS AXIS: 45 DEGREES
QRS AXIS: 66 DEGREES
QRSD INTERVAL: 78 MS
QRSD INTERVAL: 82 MS
QT INTERVAL: 338 MS
QT INTERVAL: 420 MS
QTC INTERVAL: 475 MS
QTC INTERVAL: 496 MS
RBC # BLD AUTO: 4.25 MILLION/UL (ref 3.88–5.62)
RH BLD: POSITIVE
SARS-COV-2 RNA RESP QL NAA+PROBE: POSITIVE
SODIUM SERPL-SCNC: 141 MMOL/L (ref 136–145)
T WAVE AXIS: 67 DEGREES
T WAVE AXIS: 80 DEGREES
TROPONIN I SERPL-MCNC: 0.11 NG/ML
TROPONIN I SERPL-MCNC: 0.13 NG/ML
TROPONIN I SERPL-MCNC: 0.14 NG/ML
TROPONIN I SERPL-MCNC: 0.15 NG/ML
VENTRICULAR RATE: 119 BPM
VENTRICULAR RATE: 84 BPM
WBC # BLD AUTO: 3.32 THOUSAND/UL (ref 4.31–10.16)

## 2021-05-04 PROCEDURE — 87340 HEPATITIS B SURFACE AG IA: CPT | Performed by: INTERNAL MEDICINE

## 2021-05-04 PROCEDURE — 84145 PROCALCITONIN (PCT): CPT | Performed by: INTERNAL MEDICINE

## 2021-05-04 PROCEDURE — 86704 HEP B CORE ANTIBODY TOTAL: CPT | Performed by: INTERNAL MEDICINE

## 2021-05-04 PROCEDURE — 93005 ELECTROCARDIOGRAM TRACING: CPT

## 2021-05-04 PROCEDURE — 84484 ASSAY OF TROPONIN QUANT: CPT | Performed by: EMERGENCY MEDICINE

## 2021-05-04 PROCEDURE — 84484 ASSAY OF TROPONIN QUANT: CPT | Performed by: INTERNAL MEDICINE

## 2021-05-04 PROCEDURE — 93010 ELECTROCARDIOGRAM REPORT: CPT | Performed by: INTERNAL MEDICINE

## 2021-05-04 PROCEDURE — 96374 THER/PROPH/DIAG INJ IV PUSH: CPT

## 2021-05-04 PROCEDURE — 82550 ASSAY OF CK (CPK): CPT | Performed by: INTERNAL MEDICINE

## 2021-05-04 PROCEDURE — 99222 1ST HOSP IP/OBS MODERATE 55: CPT | Performed by: INTERNAL MEDICINE

## 2021-05-04 PROCEDURE — 99291 CRITICAL CARE FIRST HOUR: CPT

## 2021-05-04 PROCEDURE — 80048 BASIC METABOLIC PNL TOTAL CA: CPT | Performed by: EMERGENCY MEDICINE

## 2021-05-04 PROCEDURE — 99291 CRITICAL CARE FIRST HOUR: CPT | Performed by: EMERGENCY MEDICINE

## 2021-05-04 PROCEDURE — 85730 THROMBOPLASTIN TIME PARTIAL: CPT | Performed by: INTERNAL MEDICINE

## 2021-05-04 PROCEDURE — 86900 BLOOD TYPING SEROLOGIC ABO: CPT | Performed by: INTERNAL MEDICINE

## 2021-05-04 PROCEDURE — 86140 C-REACTIVE PROTEIN: CPT | Performed by: INTERNAL MEDICINE

## 2021-05-04 PROCEDURE — U0005 INFEC AGEN DETEC AMPLI PROBE: HCPCS | Performed by: EMERGENCY MEDICINE

## 2021-05-04 PROCEDURE — 1123F ACP DISCUSS/DSCN MKR DOCD: CPT | Performed by: EMERGENCY MEDICINE

## 2021-05-04 PROCEDURE — 36415 COLL VENOUS BLD VENIPUNCTURE: CPT | Performed by: EMERGENCY MEDICINE

## 2021-05-04 PROCEDURE — 82728 ASSAY OF FERRITIN: CPT | Performed by: INTERNAL MEDICINE

## 2021-05-04 PROCEDURE — 71045 X-RAY EXAM CHEST 1 VIEW: CPT

## 2021-05-04 PROCEDURE — 85027 COMPLETE CBC AUTOMATED: CPT | Performed by: EMERGENCY MEDICINE

## 2021-05-04 PROCEDURE — 85610 PROTHROMBIN TIME: CPT | Performed by: EMERGENCY MEDICINE

## 2021-05-04 PROCEDURE — 99223 1ST HOSP IP/OBS HIGH 75: CPT | Performed by: PHYSICIAN ASSISTANT

## 2021-05-04 PROCEDURE — 85379 FIBRIN DEGRADATION QUANT: CPT | Performed by: INTERNAL MEDICINE

## 2021-05-04 PROCEDURE — 86901 BLOOD TYPING SEROLOGIC RH(D): CPT | Performed by: INTERNAL MEDICINE

## 2021-05-04 PROCEDURE — 85730 THROMBOPLASTIN TIME PARTIAL: CPT | Performed by: EMERGENCY MEDICINE

## 2021-05-04 PROCEDURE — 86803 HEPATITIS C AB TEST: CPT | Performed by: INTERNAL MEDICINE

## 2021-05-04 PROCEDURE — 87806 HIV AG W/HIV1&2 ANTB W/OPTIC: CPT | Performed by: INTERNAL MEDICINE

## 2021-05-04 PROCEDURE — 82948 REAGENT STRIP/BLOOD GLUCOSE: CPT

## 2021-05-04 PROCEDURE — 83880 ASSAY OF NATRIURETIC PEPTIDE: CPT | Performed by: INTERNAL MEDICINE

## 2021-05-04 PROCEDURE — 86705 HEP B CORE ANTIBODY IGM: CPT | Performed by: INTERNAL MEDICINE

## 2021-05-04 PROCEDURE — U0003 INFECTIOUS AGENT DETECTION BY NUCLEIC ACID (DNA OR RNA); SEVERE ACUTE RESPIRATORY SYNDROME CORONAVIRUS 2 (SARS-COV-2) (CORONAVIRUS DISEASE [COVID-19]), AMPLIFIED PROBE TECHNIQUE, MAKING USE OF HIGH THROUGHPUT TECHNOLOGIES AS DESCRIBED BY CMS-2020-01-R: HCPCS | Performed by: EMERGENCY MEDICINE

## 2021-05-04 RX ORDER — ACETAMINOPHEN 500 MG
1000 TABLET ORAL EVERY 6 HOURS PRN
COMMUNITY

## 2021-05-04 RX ORDER — MELATONIN
2000 DAILY
Status: DISCONTINUED | OUTPATIENT
Start: 2021-05-04 | End: 2021-05-07 | Stop reason: HOSPADM

## 2021-05-04 RX ORDER — OXYCODONE HYDROCHLORIDE 5 MG/1
5 CAPSULE ORAL
COMMUNITY
End: 2021-05-18 | Stop reason: HOSPADM

## 2021-05-04 RX ORDER — FLUTICASONE FUROATE, UMECLIDINIUM BROMIDE AND VILANTEROL TRIFENATATE 100; 62.5; 25 UG/1; UG/1; UG/1
POWDER RESPIRATORY (INHALATION)
COMMUNITY
Start: 2020-12-04

## 2021-05-04 RX ORDER — HEPARIN SODIUM 10000 [USP'U]/100ML
15 INJECTION, SOLUTION INTRAVENOUS
Status: DISCONTINUED | OUTPATIENT
Start: 2021-05-04 | End: 2021-05-04

## 2021-05-04 RX ORDER — ALBUTEROL SULFATE 90 UG/1
AEROSOL, METERED RESPIRATORY (INHALATION)
COMMUNITY
Start: 2021-03-29

## 2021-05-04 RX ORDER — DEXAMETHASONE SODIUM PHOSPHATE 4 MG/ML
6 INJECTION, SOLUTION INTRA-ARTICULAR; INTRALESIONAL; INTRAMUSCULAR; INTRAVENOUS; SOFT TISSUE EVERY 24 HOURS
Status: DISCONTINUED | OUTPATIENT
Start: 2021-05-04 | End: 2021-05-07 | Stop reason: HOSPADM

## 2021-05-04 RX ORDER — OXYCODONE HYDROCHLORIDE 5 MG/1
5 TABLET ORAL 3 TIMES DAILY PRN
Status: DISCONTINUED | OUTPATIENT
Start: 2021-05-04 | End: 2021-05-07 | Stop reason: HOSPADM

## 2021-05-04 RX ORDER — AMLODIPINE BESYLATE 5 MG/1
TABLET ORAL
COMMUNITY
Start: 2021-03-11 | End: 2021-05-07 | Stop reason: HOSPADM

## 2021-05-04 RX ORDER — ATORVASTATIN CALCIUM 40 MG/1
40 TABLET, FILM COATED ORAL
Status: DISCONTINUED | OUTPATIENT
Start: 2021-05-04 | End: 2021-05-07 | Stop reason: HOSPADM

## 2021-05-04 RX ORDER — RISPERIDONE 0.5 MG/1
0.5 TABLET, FILM COATED ORAL DAILY
Status: DISCONTINUED | OUTPATIENT
Start: 2021-05-04 | End: 2021-05-07 | Stop reason: HOSPADM

## 2021-05-04 RX ORDER — CEFTRIAXONE 1 G/50ML
1000 INJECTION, SOLUTION INTRAVENOUS EVERY 24 HOURS
Status: DISCONTINUED | OUTPATIENT
Start: 2021-05-04 | End: 2021-05-05

## 2021-05-04 RX ORDER — ASCORBIC ACID 500 MG
1000 TABLET ORAL EVERY 12 HOURS SCHEDULED
Status: DISCONTINUED | OUTPATIENT
Start: 2021-05-04 | End: 2021-05-07 | Stop reason: HOSPADM

## 2021-05-04 RX ORDER — ERYTHROMYCIN 5 MG/G
0.5 OINTMENT OPHTHALMIC EVERY 8 HOURS SCHEDULED
Status: DISCONTINUED | OUTPATIENT
Start: 2021-05-04 | End: 2021-05-07 | Stop reason: HOSPADM

## 2021-05-04 RX ORDER — ACETAMINOPHEN 325 MG/1
975 TABLET ORAL EVERY 6 HOURS PRN
Status: DISCONTINUED | OUTPATIENT
Start: 2021-05-04 | End: 2021-05-07 | Stop reason: HOSPADM

## 2021-05-04 RX ORDER — CILOSTAZOL 50 MG/1
100 TABLET ORAL 2 TIMES DAILY
Status: DISCONTINUED | OUTPATIENT
Start: 2021-05-04 | End: 2021-05-07 | Stop reason: HOSPADM

## 2021-05-04 RX ORDER — BUDESONIDE AND FORMOTEROL FUMARATE DIHYDRATE 160; 4.5 UG/1; UG/1
2 AEROSOL RESPIRATORY (INHALATION) 2 TIMES DAILY
Status: DISCONTINUED | OUTPATIENT
Start: 2021-05-04 | End: 2021-05-07 | Stop reason: HOSPADM

## 2021-05-04 RX ORDER — ZINC SULFATE 50(220)MG
220 CAPSULE ORAL DAILY
Status: DISCONTINUED | OUTPATIENT
Start: 2021-05-04 | End: 2021-05-07 | Stop reason: HOSPADM

## 2021-05-04 RX ORDER — MIRTAZAPINE 15 MG/1
30 TABLET, FILM COATED ORAL
Status: DISCONTINUED | OUTPATIENT
Start: 2021-05-04 | End: 2021-05-07 | Stop reason: HOSPADM

## 2021-05-04 RX ORDER — NICOTINE 21 MG/24HR
1 PATCH, TRANSDERMAL 24 HOURS TRANSDERMAL DAILY
Status: DISCONTINUED | OUTPATIENT
Start: 2021-05-04 | End: 2021-05-07 | Stop reason: HOSPADM

## 2021-05-04 RX ORDER — DOXYCYCLINE HYCLATE 100 MG/1
100 CAPSULE ORAL EVERY 12 HOURS
Status: DISCONTINUED | OUTPATIENT
Start: 2021-05-04 | End: 2021-05-05

## 2021-05-04 RX ORDER — ONDANSETRON 4 MG/1
4 TABLET, ORALLY DISINTEGRATING ORAL EVERY 6 HOURS PRN
Status: DISCONTINUED | OUTPATIENT
Start: 2021-05-04 | End: 2021-05-07 | Stop reason: HOSPADM

## 2021-05-04 RX ORDER — ASPIRIN 81 MG/1
81 TABLET ORAL DAILY
Status: DISCONTINUED | OUTPATIENT
Start: 2021-05-04 | End: 2021-05-07 | Stop reason: HOSPADM

## 2021-05-04 RX ORDER — FAMOTIDINE 20 MG/1
20 TABLET, FILM COATED ORAL DAILY
Status: DISCONTINUED | OUTPATIENT
Start: 2021-05-04 | End: 2021-05-07 | Stop reason: HOSPADM

## 2021-05-04 RX ORDER — RISPERIDONE 0.5 MG/1
TABLET, FILM COATED ORAL
COMMUNITY
Start: 2021-04-07

## 2021-05-04 RX ORDER — ASPIRIN 81 MG/1
81 TABLET, CHEWABLE ORAL DAILY
COMMUNITY

## 2021-05-04 RX ADMIN — METOPROLOL TARTRATE 25 MG: 25 TABLET, FILM COATED ORAL at 13:28

## 2021-05-04 RX ADMIN — DOXYCYCLINE 100 MG: 100 CAPSULE ORAL at 15:04

## 2021-05-04 RX ADMIN — CEFTRIAXONE 1000 MG: 1 INJECTION, SOLUTION INTRAVENOUS at 14:05

## 2021-05-04 RX ADMIN — REMDESIVIR 200 MG: 100 INJECTION, POWDER, LYOPHILIZED, FOR SOLUTION INTRAVENOUS at 15:03

## 2021-05-04 RX ADMIN — ERYTHROMYCIN 0.5 INCH: 5 OINTMENT OPHTHALMIC at 15:46

## 2021-05-04 RX ADMIN — DEXAMETHASONE SODIUM PHOSPHATE 6 MG: 4 INJECTION, SOLUTION INTRA-ARTICULAR; INTRALESIONAL; INTRAMUSCULAR; INTRAVENOUS; SOFT TISSUE at 14:05

## 2021-05-04 RX ADMIN — FAMOTIDINE 20 MG: 20 TABLET, FILM COATED ORAL at 15:04

## 2021-05-04 RX ADMIN — ZINC SULFATE 220 MG (50 MG) CAPSULE 220 MG: CAPSULE at 15:04

## 2021-05-04 RX ADMIN — OXYCODONE HYDROCHLORIDE AND ACETAMINOPHEN 1000 MG: 500 TABLET ORAL at 15:04

## 2021-05-04 RX ADMIN — ASPIRIN 81 MG: 81 TABLET, COATED ORAL at 15:04

## 2021-05-04 RX ADMIN — RISPERIDONE 0.5 MG: 0.5 TABLET ORAL at 15:16

## 2021-05-04 RX ADMIN — Medication 2000 UNITS: at 15:04

## 2021-05-04 RX ADMIN — HEPARIN SODIUM 15 UNITS/KG/HR: 10000 INJECTION, SOLUTION INTRAVENOUS at 09:28

## 2021-05-04 RX ADMIN — Medication 1 PATCH: at 15:04

## 2021-05-04 RX ADMIN — CILOSTAZOL 100 MG: 50 TABLET ORAL at 17:08

## 2021-05-04 RX ADMIN — BUDESONIDE AND FORMOTEROL FUMARATE DIHYDRATE 2 PUFF: 160; 4.5 AEROSOL RESPIRATORY (INHALATION) at 17:27

## 2021-05-04 NOTE — ASSESSMENT & PLAN NOTE
- Recent exposure to now COVID positive daughter  - Patient hypoxic to 86% on RA on arrival, though has known COPD and O2 requirement at baseline  - SpO2 98% on 4L NC  - Leukopenia  - New onset Afib  - COVID precautions

## 2021-05-04 NOTE — ASSESSMENT & PLAN NOTE
Acute on chronic hypoxic respiratory failure, poa, a/e/b tachypnea RR 25, sob, and hypoxia reportedly hypoxic to the 80's (not recorded) in the ER, requiring 6L NC secondary to COVID 19  Usually on 2L NC chronically  Place on covid protocol  Check Xray  Encourage incentive spirometry, proning  Wean oxygen as tolerated  Place on moderate covid protocol

## 2021-05-04 NOTE — ASSESSMENT & PLAN NOTE
Paroxysmal afib, in rapid afib on admission  Patient's daughter reports past intolerance to blood thinners - was bleeding  EKG Afib ,   CHADVASC score 4  Currently on IV heparin due to possible CVA  Would transition to Eliquis eventually  Start metoprolol 25mg BID - Uptitrate to achieve control  Would consult cardiology

## 2021-05-04 NOTE — ASSESSMENT & PLAN NOTE
- 2/2021 Echo revealed possible Aflutter at that time  - Heparin gtt inititated at 15 units/kg/hour  Titrate to goal PTT of 60-90  No bolus    - Troponin 0 11  - Consider Cardiology consultation  - Defer to Cardiology for AC/AP plan

## 2021-05-04 NOTE — CONSULTS
Consultation - Neurology   Inocente Labor 79 y o  male MRN: [id number]  Unit/Bed#: MS 36-1 Encounter: [id number]      Assessment/Plan     * Dysarthria  Assessment & Plan  79 y o  male with vascular risk factors who presented on 5/4/21 with symptoms of dysarthria and R facial droop  NIHSS 2  Last known normal was 5/3/21 at 2000  Stroke alert was called on arrival  [facility] was negative for acute abnormality  CTA was deferred due to contrast allergy and low likelihood that it would   tPA was not given due to low NIHSS/non-disabling symptoms/outside time window  EKG revealed new onset Afib  COVID test now positive  Patient's speech is minimally slurred initially; however resolves after speaking to examiner for several minutes  There is no facial droop appreciated on my exam and no other lateralizing neurologic deficits  Suspect patient's initial symptoms to be of toxic metabolic etiology due to COVID, superimposed on his fatigue in the setting of his Afib with RVR  No further stroke work-up necessary at this time  - Acute ischemic stroke protocol  - Due to new onset Afib, initiate Heparin drip 15 units/kg/hour and titrate to goal PTT of 60-90  No bolus  Defer remaining management to primary team/Cardiology  - Hold Antiplatelets for now  Defer to Cardiology for eventual management  - Atorvastatin 40mg   - CT head was negative for acute intracranial abnormality  - CTA of head and neck deferred due to contrast allergy  - 2/2021 Echo with EF 30-35%  - Telemetry  - COVID positive  - Goal of normotension  - Hyperglycemia control   - PT/OT/ST  - Stat CT head with acute decline of in exam/mental status  - Notify neurology with any changes in examination  Paroxysmal atrial fibrillation [facility])  Assessment & Plan  - 2/2021 Echo revealed possible Aflutter at that time  - Heparin gtt inititated at 15 units/kg/hour  Titrate to goal PTT of 60-90  No bolus    - Troponin 0 11  - Consider Cardiology consultation  - Defer to Cardiology for AC/AP plan    COVID-19  Assessment & Plan  - Recent exposure to now COVID positive daughter  - Patient hypoxic to 86% on RA on arrival, though has known COPD and O2 requirement at baseline  - SpO2 98% on 4L NC  - Leukopenia  - New onset Afib  - COVID precautions    Tobacco abuse  Assessment & Plan  - Educated on importance of smoking cessation    Essential hypertension  Assessment & Plan  - Permissive HTN      Recommendations for outpatient neurological follow up have yet to be determined  History of Present Illness     Reason for Consult / Principal Problem: Stroke  Hx and PE limited by: NA  HPI: Araseli Ghosh is a 79 y o male with a history of possible ICH (mentioned in the history of [facility] 10/2017), COPD, CKD, left above knee amputation, HTN, CAD s/p MI on Pletal and Aspirin and tobacco use who presented on the morning of 5/4/21 via ambulance with stroke like symptoms of dysarthria and R facial droop  NIHSS 2  Last known normal was 5/3/21 at 2000  Stroke alert was called on arrival  [facility] was obtained and was negative for acute abnormality  CTA was deferred due to contrast allergy and low likelihood that it would   tPA was not given due to low NIHSS/non-disabling symptoms/outside time window  Patient was also noted to be hypoxic at 86% and reported a recent known COVID exposure  He does use O2 at home  On 4L patient's O2 at 98%  EKG revealed new onset Afib with RVR  Due to this Heparin drip without bolus was recommended  AP placed on hold  Toponin elevated at 0 11  WBC low at 3 32  Upon further chart review, Echo completed at [address] on 2/12/21 revealed an EF of 30-35%  During the echo he was mentioned to be in possible Aflutter  In the interim patient's COVID test came back positive  Currently patient reports speech to be baseline and denies any focal neurologic deficits  He acknowledges being tired but has no other current complaints  Inpatient consult to Neurology  Consult performed by: Ben Ford PA-C  Consult ordered by: Taylor Steen DO        Review of Systems   Constitutional: Positive for fatigue  Respiratory: Positive for shortness of breath (without NC O2)  Cardiovascular: Negative for chest pain  Neurological: Negative for speech difficulty, weakness, numbness and headaches  All other systems reviewed and are negative  Historical Information   Past Medical History:   Diagnosis Date    COPD (chronic obstructive pulmonary disease) ([facility] )     [facility]physema lung ([facility] )     Kidney failure     MI, old      Past Surgical History:   Procedure Laterality Date    BACK SURGERY      BELOW KNEE LEG AMPUTATION      CARDIAC SURGERY      HERNIA REPAIR       Social History   Social History     Substance and Sexual Activity   Alcohol Use Yes    Frequency: Monthly or less    Comment: socially      Social History     Substance and Sexual Activity   Drug Use Not Currently     E-Cigarette/Vaping    E-Cigarette Use Never User      E-Cigarette/Vaping Substances    Nicotine No     Flavoring No      Social History     Tobacco Use   Smoking Status Current Every Day Smoker    Packs/day: 1 00    Types: Cigarettes   Smokeless Tobacco Never Used     Family History: History reviewed  No pertinent family history  Review of previous medical records was completed  Meds/Allergies   all current active meds have been reviewed    Allergies   Allergen Reactions    Iodine - Food Allergy Anaphylaxis    Iodinated Diagnostic Agents Other (See Comments) and Rash       Objective   Vitals:Blood pressure 125/71, pulse 72, temperature 98 7 °F (37 1 °C), resp  rate 18, weight 54 4 kg (120 lb), SpO2 91 %  ,Body mass index is 18 79 kg/m²  No intake or output data in the 24 hours ending 05/04/21 9300    Invasive Devices:    Invasive Devices     Peripheral Intravenous Line            Peripheral IV 05/04/21 Left Antecubital less than 1 day Peripheral IV 05/04/21 Right Antecubital less than 1 day                Physical Exam  Constitutional:       General: He is not in acute distress  Appearance: Normal appearance  He is well-developed  He is not ill-appearing  Comments: Frail elderly male supine in bed   HENT:      Head: Normocephalic and atraumatic  Eyes:      General: No scleral icterus  Right eye: No discharge  Left eye: No discharge  Extraocular Movements: Extraocular movements intact and EOM normal       Conjunctiva/sclera: Conjunctivae normal       Pupils: Pupils are equal, round, and reactive to light  Neck:      Musculoskeletal: Normal range of motion and neck supple  Cardiovascular:      Rate and Rhythm: Normal rate and regular rhythm  Pulmonary:      Effort: Pulmonary effort is normal  No respiratory distress  Breath sounds: No stridor  Musculoskeletal: Normal range of motion  General: Deformity (left AKA) present  No tenderness  Lymphadenopathy:      Cervical: No cervical adenopathy  Skin:     General: Skin is warm and dry  Findings: No erythema or rash  Neurological:      Mental Status: He is alert and oriented to person, place, and time  Coordination: Finger-Nose-Finger Test (trace end range tremor with LUE) normal       Deep Tendon Reflexes: Strength normal    Psychiatric:         Speech: Speech normal          Behavior: Behavior normal          Thought Content: Thought content normal          Judgment: Judgment normal        Neurologic Exam     Mental Status   Oriented to person, place, and time  Follows 1 step commands  Speech: speech is normal (Minimally dysarthric at beginning of conversation, but improves as he speaks with examiner longer  No aphasia )  Level of consciousness: alert  Knowledge: good  Normal comprehension  Can spell WORLD forwards but not backwards  Can do monetary calculation with extended time        Cranial Nerves     CN II   Visual acuity: normal (grossly)    CN III, IV, VI   Pupils are equal, round, and reactive to light  Extraocular motions are normal    Ophthalmoparesis: none  Conjugate gaze: present    CN V   Facial sensation intact  CN VII   Facial expression full, symmetric  CN XII   Tongue deviation: none    Motor Exam   Muscle bulk: normal  Overall muscle tone: normal    Strength   Strength 5/5 throughout  (Left AKA amputation)  Decreased endurance  Sensory Exam   Light touch normal      Gait, Coordination, and Reflexes     Coordination   Finger to nose coordination: normal (trace end range tremor with LUE)    Tremor   Resting tremor: absent      Lab Results: I have personally reviewed pertinent reports  Imaging Studies: I have personally reviewed pertinent films in PACS  EKG, Pathology, and Other Studies: I have personally reviewed pertinent reports

## 2021-05-04 NOTE — PLAN OF CARE
Problem: Neurological Deficit  Goal: Neurological status is stable or improving  Description: Interventions:  - Monitor and assess patient's level of consciousness, motor function, sensory function, and level of assistance needed for ADLs  - Monitor and report changes from baseline  Collaborate with interdisciplinary team to initiate plan and implement interventions as ordered  - Provide and maintain a safe environment  - Consider seizure precautions  - Consider fall precautions  - Consider aspiration precautions  - Consider bleeding precautions  Outcome: Progressing     Problem: Activity Intolerance/Impaired Mobility  Goal: Mobility/activity is maintained at optimum level for patient  Description: Interventions:  - Assess and monitor patient  barriers to mobility and need for assistive/adaptive devices  - Assess patient's emotional response to limitations  - Collaborate with interdisciplinary team and initiate plans and interventions as ordered  - Encourage independent activity per ability   - Maintain proper body alignment  - Perform active/passive rom as tolerated/ordered  - Plan activities to conserve energy   - Turn patient as appropriate  Outcome: Progressing     Problem: Communication Impairment  Goal: Ability to express needs and understand communication  Description: Assess patient's communication skills and ability to understand information  Patient will demonstrate use of effective communication techniques, alternative methods of communication and understanding even if not able to speak  - Encourage communication and provide alternate methods of communication as needed  - Collaborate with case management/ for discharge needs  - Include patient/family/caregiver in decisions related to communication    Outcome: Progressing     Problem: Potential for Aspiration  Goal: Non-ventilated patient's risk of aspiration is minimized  Description: Assess and monitor vital signs, respiratory status, and labs (WBC)  Monitor for signs of aspiration (tachypnea, cough, rales, wheezing, cyanosis, fever)  - Assess and monitor patient's ability to swallow  - Place patient up in chair to eat if possible  - HOB up at 90 degrees to eat if unable to get patient up into chair   - Supervise patient during oral intake  - Instruct patient/ family to take small bites  - Instruct patient/ family to take small single sips when taking liquids  - Follow patient-specific strategies generated by speech pathologist   Outcome: Progressing  Goal: Ventilated patient's risk of aspiration is minimized  Description: Assess and monitor vital signs, respiratory status, airway cuff pressure, and labs (WBC)  Monitor for signs of aspiration (tachypnea, cough, rales, wheezing, cyanosis, fever)  - Elevate head of bed 30 degrees if patient has tube feeding   - Monitor tube feeding  Outcome: Progressing     Problem: Nutrition  Goal: Nutrition/Hydration status is improving  Description: Monitor and assess patient's nutrition/hydration status for malnutrition (ex- brittle hair, bruises, dry skin, pale skin and conjunctiva, muscle wasting, smooth red tongue, and disorientation)  Collaborate with interdisciplinary team and initiate plan and interventions as ordered  Monitor patient's weight and dietary intake as ordered or per policy  Utilize nutrition screening tool and intervene per policy  Determine patient's food preferences and provide high-protein, high-caloric foods as appropriate  - Assist patient with eating   - Allow adequate time for meals   - Encourage patient to take dietary supplement as ordered  - Collaborate with clinical nutritionist   - Include patient/family/caregiver in decisions related to nutrition    Outcome: Progressing     Problem: PAIN - ADULT  Goal: Verbalizes/displays adequate comfort level or baseline comfort level  Description: Interventions:  - Encourage patient to monitor pain and request assistance  - Assess pain using appropriate pain scale  - Administer analgesics based on type and severity of pain and evaluate response  - Implement non-pharmacological measures as appropriate and evaluate response  - Consider cultural and social influences on pain and pain management  - Notify physician/advanced practitioner if interventions unsuccessful or patient reports new pain  Outcome: Progressing     Problem: INFECTION - ADULT  Goal: Absence or prevention of progression during hospitalization  Description: INTERVENTIONS:  - Assess and monitor for signs and symptoms of infection  - Monitor lab/diagnostic results  - Monitor all insertion sites, i e  indwelling lines, tubes, and drains  - Monitor endotracheal if appropriate and nasal secretions for changes in amount and color  - [place] appropriate cooling/warming therapies per order  - Administer medications as ordered  - Instruct and encourage patient and family to use good hand hygiene technique  - Identify and instruct in appropriate isolation precautions for identified infection/condition  Outcome: Progressing  Goal: Absence of fever/infection during neutropenic period  Description: INTERVENTIONS:  - Monitor WBC    Outcome: Progressing     Problem: SAFETY ADULT  Goal: Patient will remain free of falls  Description: INTERVENTIONS:  - Assess patient frequently for physical needs  -  Identify cognitive and physical deficits and behaviors that affect risk of falls    -  [place] fall precautions as indicated by assessment   - Educate patient/family on patient safety including physical limitations  - Instruct patient to call for assistance with activity based on assessment  - Modify environment to reduce risk of injury  - Consider OT/PT consult to assist with strengthening/mobility  Outcome: Progressing  Goal: Maintain or return to baseline ADL function  Description: INTERVENTIONS:  -  Assess patient's ability to carry out ADLs; assess patient's baseline for ADL function and identify physical deficits which impact ability to perform ADLs (bathing, care of mouth/teeth, toileting, grooming, dressing, etc )  - Assess/evaluate cause of self-care deficits   - Assess range of motion  - Assess patient's mobility; develop plan if impaired  - Assess patient's need for assistive devices and provide as appropriate  - Encourage maximum independence but intervene and supervise when necessary  - Involve family in performance of ADLs  - Assess for home care needs following discharge   - Consider OT consult to assist with ADL evaluation and planning for discharge  - Provide patient education as appropriate  Outcome: Progressing  Goal: Maintain or return mobility status to optimal level  Description: INTERVENTIONS:  - Assess patient's baseline mobility status (ambulation, transfers, stairs, etc )    - Identify cognitive and physical deficits and behaviors that affect mobility  - Identify mobility aids required to assist with transfers and/or ambulation (gait belt, sit-to-stand, lift, walker, cane, etc )  - [place] fall precautions as indicated by assessment  - Record patient progress and toleration of activity level on Mobility SBAR; progress patient to next Phase/Stage  - Instruct patient to call for assistance with activity based on assessment  - Consider rehabilitation consult to assist with strengthening/weightbearing, etc   Outcome: Progressing     Problem: DISCHARGE PLANNING  Goal: Discharge to home or other facility with appropriate resources  Description: INTERVENTIONS:  - Identify barriers to discharge w/patient and caregiver  - Arrange for needed discharge resources and transportation as appropriate  - Identify discharge learning needs (meds, wound care, etc )  - Arrange for interpretive services to assist at discharge as needed  - Refer to Case Management Department for coordinating discharge planning if the patient needs post-hospital services based on physician/advanced practitioner order or complex needs related to functional status, cognitive ability, or social support system  Outcome: Progressing     Problem: Knowledge Deficit  Goal: Patient/family/caregiver demonstrates understanding of disease process, treatment plan, medications, and discharge instructions  Description: Complete learning assessment and assess knowledge base    Interventions:  - Provide teaching at level of understanding  - Provide teaching via preferred learning methods  Outcome: Progressing

## 2021-05-04 NOTE — ASSESSMENT & PLAN NOTE
Patient diagnosed with COVID after he was noted to be hypoxic on admission, requiring 6L NC  Place on moderate covid protocol  EKG - afib w/RVR  Check Cardiac markers proBNP, CK, Troponin  Check chronic hepatitis panel, rapid HIV 1/2 ab-ag combo per covid protocol order set  Place on IV ceftriaxone and doxycycline, DC if procalcitonin negative x 2  Place on Vit D3, Vit C, Zinc, multivitamin, atorvastatin  Start IV dexamethasone and IV remdesivir  Would give tocilizumab if sustained CRP > 90 with hypoxia > 12hrs  Encourage proning, incentive spirometry    Trend Inflammatory markers q48hrs;   No results found for: DDIMER, CRP, FERRITIN

## 2021-05-04 NOTE — ED PROVIDER NOTES
History  Chief Complaint   Patient presents with    Weakness - Generalized     last known well time was around 2000 last night      This is an 60-year-old  Male who presents via ambulance with stroke-like symptoms including slurred speech and right-sided facial droop his last known normal time was 8:00 p m  last evening stroke alert was called at 8:12 a m  this morning he does have an allergy to contrast dye I spoke with Neurology Dr Kleber Ruiz at 8:16 a m  and at this time we will do a CT scan without contrast  He is currently on pletal and aspirin  Patient's room air oxygen was 86% but he was not on the oxygen that he was supposed to be using and there is a reported history of recent COVID exposure in the past 2 days  History provided by:  Patient and EMS personnel  Medical Problem  Location:   right facial  Quality:   droop with slurred speech  Onset quality:  Unable to specify  Duration: Unclear  Timing:  Constant  Chronicity:  New  Context:   right facial droop and slurred speech last known normal last evening  Relieved by:   nothing  Worsened by:   nothing      Prior to Admission Medications   Prescriptions Last Dose Informant Patient Reported?  Taking?   acetaminophen (TYLENOL) 500 mg tablet   Yes No   Sig: Take 1,000 mg by mouth every 6 (six) hours as needed   albuterol (PROVENTIL HFA,VENTOLIN HFA) 90 mcg/act inhaler   Yes Yes   amLODIPine (NORVASC) 5 mg tablet   Yes Yes   aspirin (ECOTRIN LOW STRENGTH) 81 mg EC tablet   Yes No   Sig: Take 81 mg by mouth daily   aspirin 81 mg chewable tablet   Yes No   Sig: Chew 81 mg daily   chlorthalidone 25 mg tablet   Yes No   Sig: Take 25 mg by mouth daily   cilostazol (PLETAL) 100 mg tablet   Yes No   Sig: Take 100 mg by mouth 2 (two) times a day   erythromycin (ILOTYCIN) ophthalmic ointment   No No   Sig: Administer 0 5 inches to both eyes every 8 (eight) hours   fluticasone-umeclidinium-vilanterol (Trelegy Ellipta) 100-62 5-25 MCG/INH inhaler   Yes Yes mirtazapine (REMERON) 30 mg tablet   Yes No   Sig: Take 30 mg by mouth daily at bedtime   ondansetron (ZOFRAN-ODT) 4 mg disintegrating tablet   No No   Sig: Take 1 tablet (4 mg total) by mouth every 6 (six) hours as needed for nausea or vomiting   oxyCODONE (OXY-IR) 5 MG capsule   Yes No   Sig: Take 5 mg by mouth every 8 (eight) hours   oxyCODONE (OXY-IR) 5 MG capsule   Yes No   Sig: Take 5 mg by mouth   predniSONE 10 mg tablet   Yes No   Sig: Take by mouth daily   risperiDONE (RisperDAL) 0 5 mg tablet   Yes Yes      Facility-Administered Medications: None       Past Medical History:   Diagnosis Date    [facility]     Emphysema lung ([facility] [address] )     Kidney failure     MI, old        Past Surgical History:   Procedure Laterality Date    BACK SURGERY      BELOW KNEE LEG AMPUTATION      CARDIAC SURGERY      HERNIA REPAIR         History reviewed  No pertinent family history  I have reviewed and agree with the history as documented  E-Cigarette/Vaping    E-Cigarette Use Never User      E-Cigarette/Vaping Substances    Nicotine No     Flavoring No      Social History     Tobacco Use    Smoking status: Current Every Day Smoker     Packs/day: 1 00     Types: Cigarettes    Smokeless tobacco: Never Used   Substance Use Topics    Alcohol use: Yes     Frequency: Monthly or less     Comment: socially     Drug use: Not Currently       Review of Systems   Neurological: Positive for facial asymmetry and speech difficulty  All other systems reviewed and are negative  Physical Exam  Physical Exam  Vitals signs and nursing note reviewed  Constitutional:       General: He is not in acute distress  Appearance: He is not ill-appearing, toxic-appearing or diaphoretic  HENT:      Head: Normocephalic and atraumatic        Right Ear: Tympanic membrane, ear canal and external ear normal       Left Ear: Tympanic membrane, ear canal and external ear normal    Eyes: General:         Right eye: No discharge  Left eye: No discharge  Extraocular Movements: Extraocular movements intact  Pupils: Pupils are equal, round, and reactive to light  Neck:      Musculoskeletal: Normal range of motion and neck supple  No neck rigidity or muscular tenderness  Cardiovascular:      Rate and Rhythm: Tachycardia present  Rhythm irregular  Pulmonary:      Effort: Pulmonary effort is normal  No respiratory distress  Breath sounds: Normal breath sounds  No stridor  No wheezing, rhonchi or rales  Abdominal:      General: Abdomen is flat  Bowel sounds are normal  There is no distension  Tenderness: There is no abdominal tenderness  There is no guarding or rebound  Musculoskeletal:         General: No signs of injury  Right lower leg: No edema  Left lower leg: No edema  Comments: Left AKA   Skin:     General: Skin is warm and dry  Findings: No erythema or rash  Neurological:      Mental Status: He is alert  Sensory: No sensory deficit  Coordination: Coordination normal       Comments: Slight slurred speech and right facial  Droop for and NIH stroke scale of 2   Psychiatric:         Mood and Affect: Mood normal          Thought Content:  Thought content normal          Vital Signs  ED Triage Vitals   Temperature Pulse Respirations Blood Pressure SpO2   05/04/21 0815 05/04/21 0830 05/04/21 0815 05/04/21 0815 05/04/21 0815   99 9 °F (37 7 °C) 94 20 130/82 98 %      Temp Source Heart Rate Source Patient Position - Orthostatic VS BP Location FiO2 (%)   05/04/21 0815 05/04/21 0830 05/04/21 0815 05/04/21 0815 --   Oral Monitor Lying Left arm       Pain Score       --                  Vitals:    05/04/21 0915 05/04/21 0936 05/04/21 1030 05/04/21 1100   BP: 146/79 148/83 139/69 134/69   Pulse: (!) 106 86 101 105   Patient Position - Orthostatic VS:  Lying Lying          Visual Acuity  Visual Acuity      Most Recent Value   L Pupil Size (mm)  3 R Pupil Size (mm)  3          ED Medications  Medications   heparin (porcine) 25,000 units in 0 45% NaCl 250 mL infusion (premix) (15 Units/kg/hr × 54 4 kg Intravenous New Bag 5/4/21 0928)       Diagnostic Studies  Results Reviewed     Procedure Component Value Units Date/Time    Novel Coronavirus (Covid-19),PCR [facility] - 2 hour stat [[id number]]  (Abnormal) Collected: 05/04/21 0820    Lab Status: Final result Specimen: Nares from Nasopharyngeal Swab Updated: 05/04/21 0949     SARS-CoV-2 Positive    Narrative: The specimen collection materials, transport medium, and/or testing methodology utilized in the production of these test results have been proven to be reliable in a limited validation with an abbreviated program under the Emergency Utilization Authorization provided by the FDA  Testing reported as "Presumptive positive" will be confirmed with secondary testing to ensure result accuracy  Clinical caution and judgement should be used with the interpretation of these results with consideration of the clinical impression and other laboratory testing  Testing reported as "Positive" or "Negative" has been proven to be accurate according to standard laboratory validation requirements  All testing is performed with control materials showing appropriate reactivity at standard intervals        Fingerstick Glucose (POCT) [[id number]]  (Normal) Collected: 05/04/21 0837    Lab Status: Final result Updated: 05/04/21 0946     POC Glucose 90 mg/dl     Troponin I [[id number]]  (Abnormal) Collected: 05/04/21 0820    Lab Status: Final result Specimen: Blood from Arm, Left Updated: 05/04/21 0855     Troponin I 0 11 ng/mL     Protime-INR [[id number]]  (Normal) Collected: 05/04/21 0820    Lab Status: Final result Specimen: Blood from Arm, Left Updated: 05/04/21 0843     Protime 12 7 seconds      INR 0 95    APTT [[id number]]  (Normal) Collected: 05/04/21 0820    Lab Status: Final result Specimen: Blood from Arm, Left Updated: 05/04/21 0843     PTT 36 seconds     Basic metabolic panel [[id number]] Collected: 05/04/21 0820    Lab Status: Final result Specimen: Blood from Arm, Left Updated: 05/04/21 0841     Sodium 141 mmol/L      Potassium 3 5 mmol/L      Chloride 102 mmol/L      CO2 29 mmol/L      ANION GAP 10 mmol/L      BUN 21 mg/dL      Creatinine 1 23 mg/dL      Glucose 87 mg/dL      Calcium 9 0 mg/dL      eGFR 55 ml/min/1 73sq m     Narrative:      [place] guidelines for Chronic Kidney Disease (CKD):     Stage 1 with normal or high GFR (GFR > 90 mL/min/1 73 square meters)    Stage 2 Mild CKD (GFR = 60-89 mL/min/1 73 square meters)    Stage 3A Moderate CKD (GFR = 45-59 mL/min/1 73 square meters)    Stage 3B Moderate CKD (GFR = 30-44 mL/min/1 73 square meters)    Stage 4 Severe CKD (GFR = 15-29 mL/min/1 73 square meters)    Stage 5 End Stage CKD (GFR <15 mL/min/1 73 square meters)  Note: GFR calculation is accurate only with a steady state creatinine    CBC and Platelet [[id number]]  (Abnormal) Collected: 05/04/21 0820    Lab Status: Final result Specimen: Blood from Arm, Left Updated: 05/04/21 0829     WBC 3 32 Thousand/uL      RBC 4 25 Million/uL      Hemoglobin 13 7 g/dL      Hematocrit 40 6 %      MCV 96 fL      MCH 32 2 pg      MCHC 33 7 g/dL      RDW 14 3 %      Platelets 461 Thousands/uL      MPV 9 9 fL                  CT stroke alert brain   Final Result by Lorna Zeng MD (05/04 0848)      No acute disease  Diffuse generalized volume loss, moderate grade chronic small vessel disease           Findings were directly discussed with Gray Whitt on 5/4/2021 8:45 AM       Workstation performed: [id number]         CTA stroke alert (head/neck)    (Results Pending)   CT head without contrast    (Results Pending)              Procedures  ECG 12 Lead Documentation Only    Date/Time: 5/4/2021 8:53 AM  Performed by: Brent Finney DO  Authorized by: Brent Finney DO     ECG reviewed by me, the ED Provider: yes    Patient location:  ED  Rate:     ECG rate:  119  Rhythm:     Rhythm: atrial fibrillation    Conduction:     Conduction: normal    ST segments:     ST segments:  Non-specific    CriticalCare Time  Performed by: Ayleen Contreras DO  Authorized by: Ayleen Contreras DO     Critical care provider statement:     Critical care time (minutes):  30    Critical care time was exclusive of:  Separately billable procedures and treating other patients    Critical care was necessary to treat or prevent imminent or life-threatening deterioration of the following conditions:  CNS failure or compromise    Critical care was time spent personally by me on the following activities:  Obtaining history from patient or surrogate, discussions with consultants, examination of patient, interpretation of cardiac output measurements, ordering and performing treatments and interventions, ordering and review of laboratory studies and ordering and review of radiographic studies    I assumed direction of critical care for this patient from another provider in my specialty: no               ED Course             HEART Risk Score      Most Recent Value   Heart Score Risk Calculator   History  0 Filed at: 05/04/2021 0911   ECG  1 Filed at: 05/04/2021 0911   Age  2 Filed at: 05/04/2021 0911   Risk Factors  1 Filed at: 05/04/2021 0911   Troponin  1 Filed at: 05/04/2021 0911   HEART Score  5 Filed at: 05/04/2021 [address]          Stroke Assessment     Row Name 05/04/21 0815             NIH Stroke Scale    Interval  Baseline      Level of Consciousness (1a )  0      LOC Questions (1b )  0      LOC Commands (1c )  0      Best Gaze (2 )  0      Visual (3 )  0      Facial Palsy (4 )  1      Motor Arm, Left (5a )  0      Motor Arm, Right (5b )  0      Motor Leg, Left (6a )  0      Motor Leg, Right (6b )  0      Limb Ataxia (7 )  0      Sensory (8 )  0      Best Language (9 )  1      Dysarthria (10 )  0      Extinction and Inattention (11 ) (Formerly Neglect)  0      Total  2                                  MDM    Disposition  Final diagnoses:   Stroke (cerebrum) ([address] )   Elevated troponin   New onset atrial fibrillation ([facility] [address] )   COVID-19     Time reflects when diagnosis was documented in both MDM as applicable and the Disposition within this note     Time User Action Codes Description Comment    5/4/2021  9:20 AM Dasia Pester Add [I63 9] Stroke (cerebrum) ([facility] [address] )     5/4/2021  9:27 AM Dasia Pester Add [R77 8] Elevated troponin     5/4/2021  9:27 AM Dasia Pester Add [I48 91] New onset atrial fibrillation ([facility] [address] )     5/4/2021 10:31 AM Dasia Pester Add [U07 1] COVID-19       ED Disposition     ED Disposition Condition Date/Time Comment    Admit Stable Tue May 4, 2021  9:27 AM Case was discussed with **Dr Richelle Bills* and the patient's admission status was agreed to be Admission Status: inpatient status to the service of Dr Linwood Harper   Follow-up Information    None         Patient's Medications   Discharge Prescriptions    No medications on file     No discharge procedures on file      PDMP Review     None          ED Provider  Electronically Signed by           Yamilet Blue DO  05/04/21 6560

## 2021-05-04 NOTE — ASSESSMENT & PLAN NOTE
Patient presenting with slurred speech, right sided facial droop  Rule out acute CVA  NIHSS on admission - 2  CT head - No acute intracranial anomaly  CTA head/neck- Not done due to contrast allergy  Place on stroke protocol  Did not receive tpa - out of time window  On IV heparin per neurology recs  Check MRI brain   Check ECHO  Hold off on aniplatelets for now while on IV heparin  In uncontrolled Afib - Would attempt to achieve rate control with metoprolol  Check A1c/lipid panel

## 2021-05-04 NOTE — H&P
Hans Amara  H&P- Patricia Fuentes 1940, 79 y o  male MRN: [id number]  Unit/Bed#: -01 Encounter: [id number]  Primary Care Provider: No primary care provider on file  Date and time admitted to hospital: 5/4/2021  8:08 AM    * Stroke-like symptoms  Assessment & Plan  Patient presenting with slurred speech, right sided facial droop  Rule out acute CVA  NIHSS on admission - 2  CT head - No acute intracranial anomaly  CTA head/neck- Not done due to contrast allergy  Place on stroke protocol  Did not receive tpa - out of time window  On IV heparin per neurology recs  Check MRI brain   Check ECHO  Hold off on aniplatelets for now while on IV heparin  In uncontrolled Afib - Would attempt to achieve rate control with metoprolol  Check A1c/lipid panel      Paroxysmal atrial fibrillation (HCC)  Assessment & Plan  Paroxysmal afib, in rapid afib on admission  Patient's daughter reports past intolerance to blood thinners - was bleeding  EKG Afib ,   CHADVASC score 4  Currently on IV heparin due to possible CVA  Would transition to Eliquis eventually  Start metoprolol 25mg BID - Uptitrate to achieve control  Would consult cardiology      COVID-19  Assessment & Plan  Patient diagnosed with COVID after he was noted to be hypoxic on admission, requiring 6L NC  Place on moderate covid protocol  EKG - afib w/RVR  Check Cardiac markers proBNP, CK, Troponin  Check chronic hepatitis panel, rapid HIV 1/2 ab-ag combo per covid protocol order set  Place on IV ceftriaxone and doxycycline, DC if procalcitonin negative x 2  Place on Vit D3, Vit C, Zinc, multivitamin, atorvastatin  Start IV dexamethasone and IV remdesivir  Would give tocilizumab if sustained CRP > 90 with hypoxia > 12hrs  Encourage proning, incentive spirometry    Trend Inflammatory markers q48hrs;   No results found for: DDIMER, CRP, FERRITIN    PAD (peripheral artery disease) ([facility] )  Assessment & Plan  History of PAD s/p left BKA  On cilostazol and aspirin    Acute respiratory failure with hypoxia (HCC)  Assessment & Plan  Acute on chronic hypoxic respiratory failure, poa, a/e/b tachypnea RR 25, sob, and hypoxia reportedly hypoxic to the 80's (not recorded) in the ER, requiring 6L NC secondary to COVID 19  Usually on 2L NC chronically  Place on covid protocol  Check Xray  Encourage incentive spirometry, proning  Wean oxygen as tolerated  Place on moderate covid protocol    Elevated troponin  Assessment & Plan  Non MI troponin elevation, trop 0 11 secondary to rapid afib and covid infection  Trend trop  EKG - Rapid Afib  monitor on telemtry     Sepsis due to COVID-19 [facility])  Assessment & Plan  Sepsis, poa, a/e/b tachycardia 116, tachypnea RR 25 due to covid 23  Mgt highlighted above    Tobacco abuse  Assessment & Plan  Smoking cessation counseling  Nicotine patch ordered      Essential hypertension  Assessment & Plan  On amlodipine and chlorthalidone    VTE Prophylaxis: Heparin Drip  / sequential compression device   Code Status: full code  POLST: There is no POLST form on file for this patient (pre-hospital)  Discussion with family: I updated patient's daughter over the phone    Anticipated Length of Stay:  Patient will be admitted on an Inpatient basis with an anticipated length of stay of  greater than 2 midnights  Justification for Hospital Stay: COVID infection, afib, stroke like symptoms    Total Time for Visit, including Counseling / Coordination of Care: 70 minutes  Greater than 50% of this total time spent on direct patient counseling and coordination of care  Chief Complaint:   Slurred speech    History of Present Illness:    James Wright is a 79 y o  male with PMH of COPD, chronic resp failure on 2L NC chronically who presents with slurred speech and right sided facial droop, last known normal time 8pm last night  Patient is a poor historian and history is gotten from charts   On presentation, he was in uncontrolled afib,  and reportedly hypoxic to the 80's (not recorded)  Labs showed trop 0 11, wbc 3 32  He had a NIHSS 2  CTH no acute changes, CTA head/neck not done due to contrast allergy  He did not receive tpa as he was outside time window  Given his hypoxia and reports of recent covid exposure, he was screened for COVID and test returned positive  Review of Systems:    Review of Systems   Constitutional: Positive for fatigue  Negative for activity change, chills, diaphoresis and fever  Respiratory: Positive for shortness of breath  Negative for cough, choking and chest tightness  Cardiovascular: Positive for palpitations  Negative for chest pain and leg swelling  Gastrointestinal: Negative for abdominal distention, diarrhea, nausea and vomiting  Genitourinary: Negative for dysuria and enuresis  Neurological: Positive for facial asymmetry and speech difficulty  Negative for light-headedness, numbness and headaches  All other systems reviewed and are negative  Past Medical and Surgical History:     Past Medical History:   Diagnosis Date    [facility]     [facility] )     Kidney failure     MI, old        Past Surgical History:   Procedure Laterality Date    BACK SURGERY      BELOW KNEE LEG AMPUTATION      CARDIAC SURGERY      HERNIA REPAIR         Meds/Allergies:    Prior to Admission medications    Medication Sig Start Date End Date Taking?  Authorizing Provider   albuterol (PROVENTIL HFA,VENTOLIN HFA) 90 mcg/act inhaler  3/29/21  Yes Historical Provider, MD   amLODIPine (NORVASC) 5 mg tablet  3/11/21  Yes Historical Provider, MD   fluticasone-umeclidinium-vilanterol (Trelegy Ellipta) 100-62 5-25 MCG/INH inhaler  12/4/20  Yes Historical Provider, MD   risperiDONE (RisperDAL) 0 5 mg tablet  4/7/21  Yes Historical Provider, MD   acetaminophen (TYLENOL) 500 mg tablet Take 1,000 mg by mouth every 6 (six) hours as needed    Historical Provider, MD aspirin (ECOTRIN LOW STRENGTH) 81 mg EC tablet Take 81 mg by mouth daily    Historical Provider, MD   aspirin 81 mg chewable tablet Chew 81 mg daily    Historical Provider, MD   chlorthalidone 25 mg tablet Take 25 mg by mouth daily    Historical Provider, MD   cilostazol (PLETAL) 100 mg tablet Take 100 mg by mouth 2 (two) times a day    Historical Provider, MD   erythromycin (ILOTYCIN) ophthalmic ointment Administer 0 5 inches to both eyes every 8 (eight) hours 1/12/21   Wendy Andre MD   mirtazapine (REMERON) 30 mg tablet Take 30 mg by mouth daily at bedtime    Historical Provider, MD   ondansetron (ZOFRAN-ODT) 4 mg disintegrating tablet Take 1 tablet (4 mg total) by mouth every 6 (six) hours as needed for nausea or vomiting 1/12/21   Nelson Cooley DO   oxyCODONE (OXY-IR) 5 MG capsule Take 5 mg by mouth every 8 (eight) hours    Historical Provider, MD   oxyCODONE (OXY-IR) 5 MG capsule Take 5 mg by mouth    Historical Provider, MD   predniSONE 10 mg tablet Take by mouth daily    Historical Provider, MD     I have reviewed home medications using allscripts  Allergies: Allergies   Allergen Reactions    Iodine - Food Allergy Anaphylaxis    Iodinated Diagnostic Agents Other (See Comments) and Rash       Social History:     Marital Status: /Civil Union   Occupation: Retired  Patient Pre-hospital Living Situation: lives at home  Patient Pre-hospital Level of Mobility: independent  Patient Pre-hospital Diet Restrictions: none  Substance Use History:   Social History     Substance and Sexual Activity   Alcohol Use Yes    Frequency: Monthly or less    Comment: socially      Social History     Tobacco Use   Smoking Status Current Every Day Smoker    Packs/day: 1 00    Types: Cigarettes   Smokeless Tobacco Never Used     Social History     Substance and Sexual Activity   Drug Use Not Currently       Family History:    History reviewed  No pertinent family history      Physical Exam:     Vitals: Blood Pressure: 125/71 (05/04/21 1508)  Pulse: 72 (05/04/21 1508)  Temperature: 98 7 °F (37 1 °C) (05/04/21 1508)  Temp Source: Temporal (05/04/21 0936)  Respirations: 18 (05/04/21 1508)  Weight - Scale: 54 4 kg (120 lb) (05/04/21 0830)  SpO2: 91 % (05/04/21 1508)    Physical Exam  Vitals signs and nursing note reviewed  Constitutional:       General: He is not in acute distress  Appearance: Normal appearance  He is not ill-appearing, toxic-appearing or diaphoretic  Cardiovascular:      Rate and Rhythm: Normal rate and regular rhythm  Pulses: Normal pulses  Heart sounds: Normal heart sounds  No murmur  No friction rub  No gallop  Pulmonary:      Effort: Pulmonary effort is normal  No respiratory distress  Breath sounds: Normal breath sounds  No stridor  No wheezing, rhonchi or rales  Chest:      Chest wall: No tenderness  Abdominal:      General: Bowel sounds are normal  There is no distension  Palpations: Abdomen is soft  Tenderness: There is no abdominal tenderness  There is no right CVA tenderness, left CVA tenderness or guarding  Musculoskeletal: Normal range of motion  General: Deformity (left BKA) present  No swelling, tenderness or signs of injury  Right lower leg: No edema  Skin:     General: Skin is warm  Capillary Refill: Capillary refill takes less than 2 seconds  Coloration: Skin is not jaundiced  Findings: No bruising or lesion  Neurological:      General: No focal deficit present  Mental Status: He is alert and oriented to person, place, and time  Mental status is at baseline  Cranial Nerves: No cranial nerve deficit  Psychiatric:         Mood and Affect: Mood normal          Thought Content: Thought content normal            Additional Data:     Lab Results: I have personally reviewed pertinent reports        Results from last 7 days   Lab Units 05/04/21  0820   WBC Thousand/uL 3 32*   HEMOGLOBIN g/dL 13 7 HEMATOCRIT % 40 6   PLATELETS Thousands/uL 169     Results from last 7 days   Lab Units 05/04/21  0820   SODIUM mmol/L 141   POTASSIUM mmol/L 3 5   CHLORIDE mmol/L 102   CO2 mmol/L 29   BUN mg/dL 21   CREATININE mg/dL 1 23   ANION GAP mmol/L 10   CALCIUM mg/dL 9 0   GLUCOSE RANDOM mg/dL 87     Results from last 7 days   Lab Units 05/04/21  0820   INR  0 95     Results from last 7 days   Lab Units 05/04/21  0837   POC GLUCOSE mg/dl 90               Imaging: I have personally reviewed pertinent reports  XR chest portable   Final Result by Deborah Chavez MD (05/04 8146)      No acute cardiopulmonary disease  COPD  Workstation performed: [id number]         CT stroke alert brain   Final Result by Merline Yun MD (05/04 6031)      No acute disease  Diffuse generalized volume loss, moderate grade chronic small vessel disease  Findings were directly discussed with Alesha Chavez on 5/4/2021 8:45 AM       Workstation performed: [id number]         CTA stroke alert (head/neck)    (Results Pending)   CT head without contrast    (Results Pending)   MRI Inpatient Order    (Results Pending)       EKG, Pathology, and Other Studies Reviewed on Admission:   · EKG: Afib with RVR    Allscripts / Epic Records Reviewed: Yes     ** Please Note: This note has been constructed using a voice recognition system   **

## 2021-05-04 NOTE — ASSESSMENT & PLAN NOTE
Non MI troponin elevation, trop 0 11 secondary to rapid afib and covid infection  Trend trop  EKG - Rapid Afib  monitor on telemtry

## 2021-05-04 NOTE — ASSESSMENT & PLAN NOTE
79 y o  male with vascular risk factors who presented on 5/4/21 with symptoms of dysarthria and R facial droop  NIHSS 2  Last known normal was 5/3/21 at 2000  Stroke alert was called on arrival  [facility] was negative for acute abnormality  CTA was deferred due to contrast allergy and low likelihood that it would   tPA was not given due to low NIHSS/non-disabling symptoms/outside time window  EKG revealed new onset Afib  COVID test positive  Patient's speech was minimally slurred initially; however resolved after speaking to examiner for several minutes  There was no facial droop appreciated and no other lateralizing neurologic deficits  Suspect patient's initial symptoms to be of toxic metabolic etiology due to COVID, superimposed on his fatigue in the setting of his Afib with RVR  No further stroke work-up necessary at this time  - Due to new onset Afib, initiate Heparin drip 15 units/kg/hour and titrate to goal PTT of 60-90  No bolus  Defer remaining management to primary team/Cardiology  - Hold Antiplatelets for now  Defer to Cardiology for eventual management  - Atorvastatin 40mg   - CT head was negative for acute intracranial abnormality  - CTA of head and neck deferred due to contrast allergy  - 2/2021 Echo with EF 30-35%  - Telemetry  - Goal of normotension, euglycemia  - PT/OT/ST  - Stat CT head with acute decline of in exam/mental status  - Notify neurology with any changes in examination

## 2021-05-04 NOTE — QUICK NOTE
Stroke alert called at 8:14 a m  Neurology response at immediate    70-year-old man who presents with mild facial weakness and dysarthria  Patient denies any appendicular symptoms  - patient was found to be in new onset AFib with RVR  Last known well was yesterday evening  NIHSSS 2    CTH unremarkable for any acute contributory pathology  CTA deferred due to contrast allergy and low likelihood that study would alter management  IV tPA was not given due to low NIH stroke scale score/non disabling symptoms/outside time window  Recommend starting heparin, 15 units/kg/hour and titrate to goal PTT of 60-90  No bolus    - hold antiplatelet for now  - okay to pursue rate control    Goal map greater than 100, SBP less than 210  Admit to stroke pathway

## 2021-05-05 ENCOUNTER — APPOINTMENT (INPATIENT)
Dept: NON INVASIVE DIAGNOSTICS | Facility: HOSPITAL | Age: 81
DRG: 871 | End: 2021-05-05
Payer: MEDICARE

## 2021-05-05 ENCOUNTER — APPOINTMENT (INPATIENT)
Dept: MRI IMAGING | Facility: HOSPITAL | Age: 81
DRG: 871 | End: 2021-05-05
Payer: MEDICARE

## 2021-05-05 LAB
ALBUMIN SERPL BCP-MCNC: 2.7 G/DL (ref 3.5–5)
ALP SERPL-CCNC: 95 U/L (ref 46–116)
ALT SERPL W P-5'-P-CCNC: 18 U/L (ref 12–78)
ANION GAP SERPL CALCULATED.3IONS-SCNC: 12 MMOL/L (ref 4–13)
AST SERPL W P-5'-P-CCNC: 35 U/L (ref 5–45)
ATRIAL RATE: 76 BPM
BASOPHILS # BLD MANUAL: 0 THOUSAND/UL (ref 0–0.1)
BASOPHILS NFR MAR MANUAL: 0 % (ref 0–1)
BILIRUB SERPL-MCNC: 0.5 MG/DL (ref 0.2–1)
BUN SERPL-MCNC: 39 MG/DL (ref 5–25)
CALCIUM ALBUM COR SERPL-MCNC: 9.2 MG/DL (ref 8.3–10.1)
CALCIUM SERPL-MCNC: 8.2 MG/DL (ref 8.3–10.1)
CHLORIDE SERPL-SCNC: 103 MMOL/L (ref 100–108)
CHOLEST SERPL-MCNC: 145 MG/DL (ref 50–200)
CO2 SERPL-SCNC: 25 MMOL/L (ref 21–32)
CREAT SERPL-MCNC: 1.27 MG/DL (ref 0.6–1.3)
EOSINOPHIL # BLD MANUAL: 0 THOUSAND/UL (ref 0–0.4)
EOSINOPHIL NFR BLD MANUAL: 0 % (ref 0–6)
ERYTHROCYTE [DISTWIDTH] IN BLOOD BY AUTOMATED COUNT: 13.8 % (ref 11.6–15.1)
EST. AVERAGE GLUCOSE BLD GHB EST-MCNC: 100 MG/DL
GFR SERPL CREATININE-BSD FRML MDRD: 53 ML/MIN/1.73SQ M
GLUCOSE SERPL-MCNC: 100 MG/DL (ref 65–140)
HBA1C MFR BLD: 5.1 %
HBV CORE AB SER QL: NORMAL
HBV CORE IGM SER QL: NORMAL
HBV SURFACE AG SER QL: NORMAL
HCT VFR BLD AUTO: 38.8 % (ref 36.5–49.3)
HCV AB SER QL: NORMAL
HDLC SERPL-MCNC: 59 MG/DL
HGB BLD-MCNC: 13.2 G/DL (ref 12–17)
LDLC SERPL CALC-MCNC: 77 MG/DL (ref 0–100)
LG PLATELETS BLD QL SMEAR: PRESENT
LYMPHOCYTES # BLD AUTO: 0.55 THOUSAND/UL (ref 0.6–4.47)
LYMPHOCYTES # BLD AUTO: 28 % (ref 14–44)
MCH RBC QN AUTO: 32.1 PG (ref 26.8–34.3)
MCHC RBC AUTO-ENTMCNC: 34 G/DL (ref 31.4–37.4)
MCV RBC AUTO: 94 FL (ref 82–98)
MONOCYTES # BLD AUTO: 0.3 THOUSAND/UL (ref 0–1.22)
MONOCYTES NFR BLD: 15 % (ref 4–12)
NEUTROPHILS # BLD MANUAL: 1.12 THOUSAND/UL (ref 1.85–7.62)
NEUTS BAND NFR BLD MANUAL: 2 % (ref 0–8)
NEUTS SEG NFR BLD AUTO: 55 % (ref 43–75)
NRBC BLD AUTO-RTO: 0 /100 WBCS
P AXIS: 54 DEGREES
PLATELET # BLD AUTO: 166 THOUSANDS/UL (ref 149–390)
PLATELET BLD QL SMEAR: ABNORMAL
PMV BLD AUTO: 10.2 FL (ref 8.9–12.7)
POTASSIUM SERPL-SCNC: 3.8 MMOL/L (ref 3.5–5.3)
PR INTERVAL: 150 MS
PROCALCITONIN SERPL-MCNC: <0.05 NG/ML
PROT SERPL-MCNC: 6.2 G/DL (ref 6.4–8.2)
QRS AXIS: 51 DEGREES
QRSD INTERVAL: 84 MS
QT INTERVAL: 420 MS
QTC INTERVAL: 472 MS
RBC # BLD AUTO: 4.11 MILLION/UL (ref 3.88–5.62)
SODIUM SERPL-SCNC: 140 MMOL/L (ref 136–145)
T WAVE AXIS: 66 DEGREES
TOTAL CELLS COUNTED SPEC: 100
TRIGL SERPL-MCNC: 44 MG/DL
VENTRICULAR RATE: 76 BPM
WBC # BLD AUTO: 1.97 THOUSAND/UL (ref 4.31–10.16)

## 2021-05-05 PROCEDURE — 85027 COMPLETE CBC AUTOMATED: CPT | Performed by: INTERNAL MEDICINE

## 2021-05-05 PROCEDURE — 99232 SBSQ HOSP IP/OBS MODERATE 35: CPT | Performed by: INTERNAL MEDICINE

## 2021-05-05 PROCEDURE — 92610 EVALUATE SWALLOWING FUNCTION: CPT

## 2021-05-05 PROCEDURE — G1004 CDSM NDSC: HCPCS

## 2021-05-05 PROCEDURE — 70551 MRI BRAIN STEM W/O DYE: CPT

## 2021-05-05 PROCEDURE — 80061 LIPID PANEL: CPT | Performed by: INTERNAL MEDICINE

## 2021-05-05 PROCEDURE — 84145 PROCALCITONIN (PCT): CPT | Performed by: INTERNAL MEDICINE

## 2021-05-05 PROCEDURE — 93010 ELECTROCARDIOGRAM REPORT: CPT | Performed by: INTERNAL MEDICINE

## 2021-05-05 PROCEDURE — 85007 BL SMEAR W/DIFF WBC COUNT: CPT | Performed by: INTERNAL MEDICINE

## 2021-05-05 PROCEDURE — 80053 COMPREHEN METABOLIC PANEL: CPT | Performed by: INTERNAL MEDICINE

## 2021-05-05 PROCEDURE — 83036 HEMOGLOBIN GLYCOSYLATED A1C: CPT | Performed by: INTERNAL MEDICINE

## 2021-05-05 PROCEDURE — 99222 1ST HOSP IP/OBS MODERATE 55: CPT | Performed by: INTERNAL MEDICINE

## 2021-05-05 RX ADMIN — BUDESONIDE AND FORMOTEROL FUMARATE DIHYDRATE 2 PUFF: 160; 4.5 AEROSOL RESPIRATORY (INHALATION) at 17:06

## 2021-05-05 RX ADMIN — MIRTAZAPINE 30 MG: 15 TABLET, FILM COATED ORAL at 20:25

## 2021-05-05 RX ADMIN — Medication 1 PATCH: at 20:25

## 2021-05-05 RX ADMIN — ERYTHROMYCIN 0.5 INCH: 5 OINTMENT OPHTHALMIC at 14:12

## 2021-05-05 RX ADMIN — REMDESIVIR 100 MG: 100 INJECTION, POWDER, LYOPHILIZED, FOR SOLUTION INTRAVENOUS at 14:12

## 2021-05-05 RX ADMIN — METOPROLOL TARTRATE 25 MG: 25 TABLET, FILM COATED ORAL at 09:15

## 2021-05-05 RX ADMIN — APIXABAN 2.5 MG: 2.5 TABLET, FILM COATED ORAL at 17:06

## 2021-05-05 RX ADMIN — DOXYCYCLINE 100 MG: 100 CAPSULE ORAL at 12:09

## 2021-05-05 RX ADMIN — ASPIRIN 81 MG: 81 TABLET, COATED ORAL at 09:15

## 2021-05-05 RX ADMIN — CEFTRIAXONE 1000 MG: 1 INJECTION, SOLUTION INTRAVENOUS at 14:10

## 2021-05-05 RX ADMIN — DOXYCYCLINE 100 MG: 100 CAPSULE ORAL at 14:11

## 2021-05-05 RX ADMIN — OXYCODONE HYDROCHLORIDE AND ACETAMINOPHEN 1000 MG: 500 TABLET ORAL at 20:24

## 2021-05-05 RX ADMIN — ZINC SULFATE 220 MG (50 MG) CAPSULE 220 MG: CAPSULE at 09:15

## 2021-05-05 RX ADMIN — ERYTHROMYCIN 0.5 INCH: 5 OINTMENT OPHTHALMIC at 05:39

## 2021-05-05 RX ADMIN — OXYCODONE HYDROCHLORIDE AND ACETAMINOPHEN 1000 MG: 500 TABLET ORAL at 09:15

## 2021-05-05 RX ADMIN — ERYTHROMYCIN 0.5 INCH: 5 OINTMENT OPHTHALMIC at 20:28

## 2021-05-05 RX ADMIN — METOPROLOL TARTRATE 25 MG: 25 TABLET, FILM COATED ORAL at 20:24

## 2021-05-05 RX ADMIN — CILOSTAZOL 100 MG: 50 TABLET ORAL at 17:06

## 2021-05-05 RX ADMIN — Medication 2000 UNITS: at 09:15

## 2021-05-05 RX ADMIN — FAMOTIDINE 20 MG: 20 TABLET, FILM COATED ORAL at 09:15

## 2021-05-05 RX ADMIN — BUDESONIDE AND FORMOTEROL FUMARATE DIHYDRATE 2 PUFF: 160; 4.5 AEROSOL RESPIRATORY (INHALATION) at 09:15

## 2021-05-05 RX ADMIN — ATORVASTATIN CALCIUM 40 MG: 40 TABLET, FILM COATED ORAL at 20:25

## 2021-05-05 RX ADMIN — OXYCODONE HYDROCHLORIDE 5 MG: 5 TABLET ORAL at 12:08

## 2021-05-05 RX ADMIN — RISPERIDONE 0.5 MG: 0.5 TABLET ORAL at 09:16

## 2021-05-05 RX ADMIN — DEXAMETHASONE SODIUM PHOSPHATE 6 MG: 4 INJECTION, SOLUTION INTRA-ARTICULAR; INTRALESIONAL; INTRAMUSCULAR; INTRAVENOUS; SOFT TISSUE at 14:10

## 2021-05-05 RX ADMIN — CILOSTAZOL 100 MG: 50 TABLET ORAL at 09:15

## 2021-05-05 RX ADMIN — APIXABAN 2.5 MG: 2.5 TABLET, FILM COATED ORAL at 09:15

## 2021-05-05 NOTE — CASE MANAGEMENT
LOS: 1 day  Pt is a documented bundle for Stroke  Pt is not a 30 day readmission  Unplanned readmission score is 24 and green  Call placed to Pt outside of Pt's hospital room via magy warner due to COVID + protocol  Pt reports he lives with his dtr and dtr's boyfriend [place]  Pt reports his PCP is Dr Vangie Uribe in [place]  Pt reports his dtr is POA and he has living will  Pt reports he uses [facility] in [place], has prescription plan and is able to afford medications  Pt reports he has oxygen, walker, cane, shower chair, commode  Pt reports he has his prosthetic leg at home  Pt reports he has been to [facility] for SNF  Pt denies hx of mental health and drug and alcohol treatment  Pt reports he has PT from [facility]  Pt gave permission for CM to speak with his dtr, Nickie re: discharge planning  Call placed to Pt's dtr(Goldie: [phone]), discussed role of case management and discharge planning  Pt's dtr reports that Pt lives alone in basement apartment at her house  Pt's dtr reports Pt has chair lift to basement  Pt's dtr reports Pt uses wheelchair and confirmed Pt's DME and informed that Pt's oxygen is through [place] though [place]  Pt's dtr reports that her boyfriend assists Pt with adls and she and her boyfriend provides transportation, grocery shopping and cooking  Pt's dtr reports Pt was at [place] [id number] and [facility] in past  Pt's dtr reports that Pt has [facility] for PT/OT  Call placed to [facility], spoke with [phone]), Agus Song confirmed that Pt is current with them for  PT/OT services  Agus Song informed CM that information can be faxed to them at [phone]  Await PT/OT eval  CM to follow

## 2021-05-05 NOTE — ASSESSMENT & PLAN NOTE
Patient diagnosed with COVID after he was noted to be hypoxic on admission, requiring 6L NC  Place on moderate covid protocol  EKG - afib w/RVR  Cardiac markers proBNP 4709, , Troponin 0 11  hepatitis panel normal  IV ceftriaxone and doxycycline discontinued, procalcitonin negative x 2  on Vit D3, Vit C, Zinc, multivitamin, atorvastatin  On IV dexamethasone and IV remdesivir  Would give tocilizumab if sustained CRP > 90 with hypoxia > 12hrs  Encourage proning, incentive spirometry    Trend Inflammatory markers q48hrs;  Lab Results   Component Value Date/Time    DDIMER 3 15 (H) 05/04/2021 01:38 PM    CRP 70 6 (H) 05/04/2021 01:38 PM    FERRITIN 592 (H) 05/04/2021 01:38 PM

## 2021-05-05 NOTE — CONSULTS
Consultation - Cardiology   Prasanna Meraz 79 y o  male MRN: [id number]  Unit/Bed#: MS 36-1 Encounter: [id number]    Assessment/Plan     Assessment:    Paroxysmal Atrial Fibrillation  COVID 19 moderate protocol  Hx of popliteal aneurysm  PVD  COPD      Plan:      He is currently in normal sinus rhythm and comfortable at rest  Continue with metoprolol and eliquis as ordered  Continue with current medical therapy  He will need outpatient cardiology followup  History of Present Illness   Physician Requesting Consult: Rosalee John MD  Reason for Consult / Principal Problem: Atrial Fibrillation  HPI: Prasanna Meraz is a 79y o  year old male who presents with slurred speech, right sided droop and weakness  Stroke alert called  Head ct was negative  TPA not given  He is covid positive  EKG on admission revealed atrial fibrillation  He has subsequently converted to NSR  He states he has no prior history of atrial fibrillation as far as he knows  He typically follows with a vascular surgery for 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  S/P L BKA    He is currently feeling better since admit  He has no chest pain or palpitations  He has no prior history of CAD or arrhythmia  Inpatient consult to Cardiology  Consult performed by: Sarthak Infante MD  Consult ordered by: Rosalee John MD          Review of Systems   Constitutional: Negative for chills and fever  HENT: Negative for ear pain and sore throat  Eyes: Negative for pain and visual disturbance  Respiratory: Negative for cough and shortness of breath  Cardiovascular: Negative for chest pain and palpitations  Gastrointestinal: Negative for abdominal pain and vomiting  Endocrine: Negative  Genitourinary: Negative for dysuria and hematuria  Musculoskeletal: Negative for arthralgias and back pain  Skin: Negative for color change and rash  Allergic/Immunologic: Negative      Neurological: Positive for facial asymmetry and weakness  Negative for seizures and syncope  Hematological: Negative  Psychiatric/Behavioral: Negative  All other systems reviewed and are negative  Historical Information   Past Medical History:   Diagnosis Date    [facility]     [facility]     Kidney failure     MI, old      Past Surgical History:   Procedure Laterality Date    BACK SURGERY      BELOW KNEE LEG AMPUTATION      CARDIAC SURGERY      HERNIA REPAIR       Social History     Substance and Sexual Activity   Alcohol Use Yes    Frequency: Monthly or less    Comment: socially      Social History     Substance and Sexual Activity   Drug Use Not Currently     E-Cigarette/Vaping    E-Cigarette Use Never User      E-Cigarette/Vaping Substances    Nicotine No     Flavoring No      Social History     Tobacco Use   Smoking Status Current Every Day Smoker    Packs/day: 1 00    Types: Cigarettes   Smokeless Tobacco Never Used     Family History: History reviewed  No pertinent family history      Meds/Allergies   current meds:   Current Facility-Administered Medications   Medication Dose Route Frequency    acetaminophen (TYLENOL) tablet 975 mg  975 mg Oral Q6H PRN    apixaban (ELIQUIS) tablet 2 5 mg  2 5 mg Oral BID    ascorbic acid (VITAMIN C) tablet 1,000 mg  1,000 mg Oral Q12H [address]    aspirin (ECOTRIN LOW STRENGTH) EC tablet 81 mg  81 mg Oral Daily    atorvastatin (LIPITOR) tablet 40 mg  40 mg Oral HS    budesonide-formoterol (SYMBICORT) 160-4 5 mcg/act inhaler 2 puff  2 puff Inhalation BID    cholecalciferol (VITAMIN D3) tablet 2,000 Units  2,000 Units Oral Daily    cilostazol (PLETAL) tablet 100 mg  100 mg Oral BID    dexamethasone (DECADRON) injection 6 mg  6 mg Intravenous Q24H    erythromycin (ILOTYCIN) 0 5 % ophthalmic ointment 0 5 inch  0 5 inch Both Eyes Q8H [address]    famotidine (PEPCID) tablet 20 mg  20 mg Oral Daily    metoprolol tartrate (LOPRESSOR) tablet 25 mg  25 mg Oral Q12H [address]    mirtazapine (REMERON) tablet 30 mg  30 mg Oral HS    nicotine (NICODERM CQ) 14 mg/24hr TD 24 hr patch 1 patch  1 patch Transdermal Daily    ondansetron (ZOFRAN-ODT) dispersible tablet 4 mg  4 mg Oral Q6H PRN    oxyCODONE (ROXICODONE) IR tablet 5 mg  5 mg Oral TID PRN    remdesivir (Veklury) 100 mg in sodium chloride 0 9 % 250 mL IVPB  100 mg Intravenous Q24H    risperiDONE (RisperDAL) tablet 0 5 mg  0 5 mg Oral Daily    zinc sulfate (ZINCATE) capsule 220 mg  220 mg Oral Daily     Allergies   Allergen Reactions    Iodine - Food Allergy Anaphylaxis    Iodinated Diagnostic Agents Other (See Comments) and Rash       Objective   Vitals: Blood pressure 127/73, pulse 79, temperature 98 °F (36 7 °C), resp  rate 18, height 5' 7" (1 702 m), weight 54 4 kg (120 lb), SpO2 94 %  Orthostatic Blood Pressures      Most Recent Value   Blood Pressure  127/73 filed at 05/05/2021 0448   Patient Position - Orthostatic VS  Lying filed at 05/04/2021 1339            Intake/Output Summary (Last 24 hours) at 5/5/2021 1125  Last data filed at 5/4/2021 2217  Gross per 24 hour   Intake 280 ml   Output 350 ml   Net -70 ml       Invasive Devices     Peripheral Intravenous Line            Peripheral IV 05/04/21 Left Antecubital 1 day    Peripheral IV 05/04/21 Right Antecubital less than 1 day                Physical Exam  Vitals signs and nursing note reviewed  Constitutional:       Appearance: He is well-developed  HENT:      Head: Normocephalic and atraumatic  Eyes:      Conjunctiva/sclera: Conjunctivae normal    Neck:      Musculoskeletal: Neck supple  Cardiovascular:      Rate and Rhythm: Normal rate and regular rhythm  Heart sounds: No murmur  Pulmonary:      Effort: Pulmonary effort is normal  No respiratory distress  Breath sounds: Normal breath sounds  Abdominal:      Palpations: Abdomen is soft  Tenderness: There is no abdominal tenderness     Skin:     General: Skin is warm and dry  Neurological:      General: No focal deficit present  Mental Status: He is alert and oriented to person, place, and time  Psychiatric:         Mood and Affect: Mood normal          Behavior: Behavior normal          Lab Results:   I have personally reviewed pertinent lab results  CBC with diff:   Results from last 7 days   Lab Units 05/05/21  0539   WBC Thousand/uL 1 97*   RBC Million/uL 4 11   HEMOGLOBIN g/dL 13 2   HEMATOCRIT % 38 8   MCV fL 94   MCH pg 32 1   MCHC g/dL 34 0   RDW % 13 8   MPV fL 10 2   PLATELETS Thousands/uL 166     CMP:   Results from last 7 days   Lab Units 05/05/21  0539   SODIUM mmol/L 140   POTASSIUM mmol/L 3 8   CHLORIDE mmol/L 103   CO2 mmol/L 25   BUN mg/dL 39*   CREATININE mg/dL 1 27   CALCIUM mg/dL 8 2*   AST U/L 35   ALT U/L 18   ALK PHOS U/L 95   EGFR ml/min/1 73sq m 53     Troponin:   0   Lab Value Date/Time    TROPONINI 0 13 (H) 05/04/2021 2038    TROPONINI 0 14 (H) 05/04/2021 1546    TROPONINI 0 15 (H) 05/04/2021 1338    TROPONINI 0 11 (H) 05/04/2021 0820     BNP:   Results from last 7 days   Lab Units 05/05/21  0539   POTASSIUM mmol/L 3 8   CHLORIDE mmol/L 103   CO2 mmol/L 25   BUN mg/dL 39*   CREATININE mg/dL 1 27   CALCIUM mg/dL 8 2*   EGFR ml/min/1 73sq m 53     Coags:   Results from last 7 days   Lab Units 05/04/21  1547 05/04/21  0820   PTT seconds 126* 36   INR   --  0 95     TSH:     Magnesium:     Lipid Profile:   Results from last 7 days   Lab Units 05/05/21  0539   HDL mg/dL 59   LDL CALC mg/dL 77   TRIGLYCERIDES mg/dL 44     Imaging: I have personally reviewed pertinent films in PACS  EKG: Atrial Fibrillation       Code Status: Level 3 - DNAR and DNI  Advance Directive and Living Will:      Power of : Yes  POLST:      Counseling / Coordination of Care  Total floor / unit time spent today 45 minutes  Greater than 50% of total time was spent with the patient and / or family counseling and / or coordination of care    A description of the counseling / coordination of care

## 2021-05-05 NOTE — ASSESSMENT & PLAN NOTE
Paroxysmal afib, in rapid afib on admission  Patient's daughter reports past intolerance to blood thinners - was bleeding  EKG Afib ,   CHADVASC score 4  s/p IV heparin, now on Eliquis  On metoprolol 25mg BID - Uptitrate to achieve control  Now in normal sinus rhythm  Cardiology on board

## 2021-05-05 NOTE — PROGRESS NOTES
[place]  Progress Note - Clemente Murray 1940, 79 y o  male MRN: [id number]  Unit/Bed#: -01 Encounter: [id number]  Primary Care Provider: No primary care provider on file     Date and time admitted to hospital: 5/4/2021  8:08 AM    COVID-19  Assessment & Plan  Patient diagnosed with COVID after he was noted to be hypoxic on admission, requiring 6L NC  Place on moderate covid protocol  EKG - afib w/RVR  Cardiac markers proBNP 4709, , Troponin 0 11  hepatitis panel normal  IV ceftriaxone and doxycycline discontinued, procalcitonin negative x 2  on Vit D3, Vit C, Zinc, multivitamin, atorvastatin  On IV dexamethasone and IV remdesivir  Would give tocilizumab if sustained CRP > 90 with hypoxia > 12hrs  Encourage proning, incentive spirometry    Trend Inflammatory markers q48hrs;  Lab Results   Component Value Date/Time    DDIMER 3 15 (H) 05/04/2021 01:38 PM    CRP 70 6 (H) 05/04/2021 01:38 PM    FERRITIN 592 (H) 05/04/2021 01:38 PM       Paroxysmal atrial fibrillation (HCC)  Assessment & Plan  Paroxysmal afib, in rapid afib on admission  Patient's daughter reports past intolerance to blood thinners - was bleeding  EKG Afib ,   CHADVASC score 4  s/p IV heparin, now on Eliquis  On metoprolol 25mg BID - Uptitrate to achieve control  Now in normal sinus rhythm  Cardiology on board    * Dysarthria  Assessment & Plan  Patient presenting with slurred speech, right sided facial droop  Rule out acute CVA  NIHSS on admission - 2  CT head - No acute intracranial anomaly  CTA head/neck- Not done due to contrast allergy  Initially placed on stroke protocol, but this was discontinued by neurology as his symptoms were attributed to COVID, superimposed on fatigue in the setting of rapid afib  Did not receive tpa - out of time window  Check MRI brain   Check ECHO    PAD (peripheral artery disease) ([facility] [address] )  Assessment & Plan  History of PAD s/p left BKA  On cilostazol and aspirin    Acute respiratory failure with hypoxia (HCC)  Assessment & Plan  Acute on chronic hypoxic respiratory failure, poa, a/e/b tachypnea RR 25, sob, and hypoxia reportedly hypoxic to the 80's (not recorded) in the ER, requiring 6L NC secondary to COVID 19  Usually on 2L NC chronically  Currently on covid protocol  Chest Xray - severe emphysema, no acute cardiopulmonary disease  Encourage incentive spirometry, proning  Wean oxygen as tolerated      Elevated troponin  Assessment & Plan  Non MI troponin elevation, trop 0 11 secondary to rapid afib and covid infection  Trop peaked at 0 15  EKG - Rapid Afib intially now NSR      Sepsis due to COVID-19 [facility])  Assessment & Plan  Sepsis, poa, a/e/b tachycardia 116, tachypnea RR 25 due to covid 23  Mgt highlighted above    Tobacco abuse  Assessment & Plan  Smoking cessation counseling  Nicotine patch ordered      COPD (chronic obstructive pulmonary disease) ([facility])  Assessment & Plan  Not in acute COPD exacerbation, chronically on 2L NC  Continue with home inhalers      Essential hypertension  Assessment & Plan  On amlodipine and chlorthalidone      VTE Pharmacologic Prophylaxis:   Pharmacologic: Apixaban (Eliquis)  Mechanical VTE Prophylaxis in Place: Yes    Patient Centered Rounds: I have performed bedside rounds with nursing staff today  Discussions with Specialists or Other Care Team Provider: CM, Neurology    Education and Discussions with Family / Patient: patient's daughter    Time Spent for Care: 30 minutes  More than 50% of total time spent on counseling and coordination of care as described above      Current Length of Stay: 1 day(s)    Current Patient Status: Inpatient   Certification Statement: The patient will continue to require additional inpatient hospital stay due to as above    Discharge Plan: 1-2 days    Code Status: Level 3 - DNAR and DNI      Subjective:   No overnight events, no complaints this am    Objective:     Vitals:   Temp (24hrs), Av °F (36 7 °C), Min:98 °F (36 7 °C), Max:98 °F (36 7 °C)    Temp:  [98 °F (36 7 °C)] 98 °F (36 7 °C)  HR:  [68-79] 79  Resp:  [18] 18  BP: (124-127)/(72-73) 127/73  SpO2:  [94 %-95 %] 94 %  Body mass index is 18 79 kg/m²  Input and Output Summary (last 24 hours): Intake/Output Summary (Last 24 hours) at 5/5/2021 1513  Last data filed at 5/5/2021 1410  Gross per 24 hour   Intake 940 ml   Output 350 ml   Net 590 ml       Physical Exam:     Physical Exam  Vitals signs and nursing note reviewed  Constitutional:       General: He is not in acute distress  Appearance: Normal appearance  He is not ill-appearing, toxic-appearing or diaphoretic  Cardiovascular:      Rate and Rhythm: Normal rate and regular rhythm  Neurological:      Mental Status: He is alert  Additional Data:     Labs:    Results from last 7 days   Lab Units 05/05/21  0539   WBC Thousand/uL 1 97*   HEMOGLOBIN g/dL 13 2   HEMATOCRIT % 38 8   PLATELETS Thousands/uL 166   BANDS PCT % 2   LYMPHO PCT % 28   MONO PCT % 15*   EOS PCT % 0     Results from last 7 days   Lab Units 05/05/21  0539   SODIUM mmol/L 140   POTASSIUM mmol/L 3 8   CHLORIDE mmol/L 103   CO2 mmol/L 25   BUN mg/dL 39*   CREATININE mg/dL 1 27   ANION GAP mmol/L 12   CALCIUM mg/dL 8 2*   ALBUMIN g/dL 2 7*   TOTAL BILIRUBIN mg/dL 0 50   ALK PHOS U/L 95   ALT U/L 18   AST U/L 35   GLUCOSE RANDOM mg/dL 100     Results from last 7 days   Lab Units 05/04/21  0820   INR  0 95     Results from last 7 days   Lab Units 05/04/21  0837   POC GLUCOSE mg/dl 90     Results from last 7 days   Lab Units 05/05/21  0539   HEMOGLOBIN A1C % 5 1     Results from last 7 days   Lab Units 05/05/21  0539 05/04/21  1338   PROCALCITONIN ng/ml <0 05 <0 05           * I Have Reviewed All Lab Data Listed Above  * Additional Pertinent Lab Tests Reviewed:  All Labs Within Last 24 Hours Reviewed    Imaging:    Imaging Reports Reviewed Today Include:   Imaging Personally Reviewed by Myself Includes:      Recent Cultures (last 7 days):           Last 24 Hours Medication List:   Current Facility-Administered Medications   Medication Dose Route Frequency Provider Last Rate    acetaminophen  975 mg Oral Q6H PRN Brennen Rand MD      apixaban  2 5 mg Oral BID Brennen Rand MD      ascorbic acid  1,000 mg Oral Q12H [address] Brennen Rand MD      aspirin  81 mg Oral Daily Brennen Rand MD      atorvastatin  40 mg Oral HS Brennen Rand MD      budesonide-formoterol  2 puff Inhalation BID Brennen Rand MD      cholecalciferol  2,000 Units Oral Daily Brennen Rand MD      cilostazol  100 mg Oral BID Brennen Rand MD      dexamethasone  6 mg Intravenous Q24H Brennen Rand MD      erythromycin  0 5 inch Both Eyes Q8H [address] Brennen Rand MD      famotidine  20 mg Oral Daily Brennen Rand MD      metoprolol tartrate  25 mg Oral Q12H [address] Brennen Rand MD      mirtazapine  30 mg Oral HS Brennen Rand MD      nicotine  1 patch Transdermal Daily Brennen Rand MD      ondansetron  4 mg Oral Q6H PRN Brennen Rand MD      oxyCODONE  5 mg Oral TID PRN Brennen Rand MD      remdesivir  100 mg Intravenous Q24H Brennen Rand MD      risperiDONE  0 5 mg Oral Daily Brennen Rand MD      zinc sulfate  220 mg Oral Daily Brennen Rand MD          Today, Patient Was Seen By: Brennen Rand MD    ** Please Note: Dictation voice to text software may have been used in the creation of this document   **

## 2021-05-05 NOTE — SPEECH THERAPY NOTE
Speech Language/Pathology  Speech-Language Pathology Bedside Swallow Evaluation        Patient Name: Ashanti Breen    HQPMV'M Date: 5/5/2021     Problem List  Principal Problem:    Dysarthria  Active Problems:    Essential hypertension    COPD (chronic obstructive pulmonary disease) ([facility])    Tobacco abuse    Paroxysmal atrial fibrillation ([facility] [address] )    COVID-19    Sepsis due to COVID-19 ([facility] )    Elevated troponin    Acute respiratory failure with hypoxia ([facility])    PAD (peripheral artery disease) ([facility] [address] )         Past Medical History  Past Medical History:   Diagnosis Date    COPD [facility]chronic obstructive [facility] disease) [facility] )     Emphysema lung ([facility] )     Kidney failure     MI, old        Past Surgical History  Past Surgical History:   Procedure Laterality Date    BACK SURGERY      BELOW KNEE LEG AMPUTATION      CARDIAC SURGERY      HERNIA REPAIR         Summary    Pt presents with mild oral dysphagia, including prolonged mastication and bolus formation, likely due to being edentulous  There was possibly a minimal R side labial droop/weakness, but this did not affect the swallow, and no pocketing was observed  Pharyngeal swallows appeared [facility]  There were no overt s/s of aspiration; pt noted to cough 2-3 times during the meal, but also coughed prior to the meal  Cough did not appear due to aspiration, but suspect cough is more likely due to Covid infection, and CXR is clear  Speech and language appeared [facility] during conversation  At this time, pt is appropriate to continue current diet, and no further ST is recommended  If status changes, please send a new order  Recommendations:   Diet: regular diet and thin liquids, straws ok  Meds: whole with liquid   Independent for feeding  Aspiration precautions and compensatory swallowing strategies: upright posture, slow rate of feeding and small bites/sips  Other Recommendations/ considerations: No further ST recommended at this time; discharge from [address] [place] [address] Dr          Current Medical Status  Copied from physician:  Sadia Walton is a 79 y o  male with PMH of COPD, chronic resp failure on 2L NC chronically who presents with slurred speech and right sided facial droop, last known normal time 8pm last night  Patient is a poor historian and history is gotten from charts  On presentation, he was in uncontrolled afib,  and reportedly hypoxic to the 80's (not recorded)  Labs showed trop 0 11, wbc 3 32  He had a NIHSS 2  CTH no acute changes, CTA head/neck not done due to contrast allergy  He did not receive tpa as he was outside time window  Order received and chart reviewed  Spoke with RN prior to assessment  Slight R droop reported today,but speech is reportedly improved  RN feels pt is tolerating diet and taking pills well  Past medical history:   Please see H&P for details    Special Studies:  CXR-No acute cardiopulmonary disease  COPD     CT head-No acute disease  Diffuse generalized volume loss, moderate grade chronic small vessel disease  MRI-ordered    Social/Education/Vocational Hx:  Pt lives with family    Swallow Information   Current Risks for Dysphagia & Aspiration: ? CVA, mild R facial/labial weakness?   Current Symptoms/Concerns: change in respiratory status  Current Diet: regular diet and thin liquids   Baseline Diet: regular diet and thin liquids    Baseline Assessment   Behavior/Cognition: alert  Speech/Language Status: able to participate in conversation and able to follow commands  Patient Positioning: upright in bed     Swallow Mechanism Exam   Facial: symmetrical  Labial: decreased ROM right side and decreased strength, very minimal  Lingual: WFL, tongue is coated  Velum: symmetrical  Mandible: adequate ROM  Dentition: edentulous  Vocal quality:clear/adequate   Volitional Cough: strong/productive   Respiratory: NC, 6L    Consistencies Assessed and Performance   Consistencies Administered: thin liquids, soft solids and hard solids  Tossed salad with dressing, meatloaf, mashed potatoes and gravy, coffee by cup, soda by straw    Oral Stage: Adequate bolus retrieval and draw from straw, good lip seal, prolonged mastication and bolus formation, transfer appeared prompt, no oral residue or pocketing  Pharyngeal Stage: Hyolaryngeal elevation observed and palpated  Swallows appear prompt  There were no overt s/s of aspiration  Pt noted to have a congested cough prior to lunch, and infrequently during the meal  Suspect the cough is due to Covid rather than aspiration; CXR is clear         Esophageal Concerns: none reported      Results Reviewed with: patient and RN   Dysphagia Goals: none at this time  Discharge recommendation: no follow up needed

## 2021-05-05 NOTE — UTILIZATION REVIEW
Initial Clinical Review    Admission: Date/Time/Statement:   Admission Orders (From admission, onward)     Ordered        05/04/21 0928  Inpatient Admission  Once                   Orders Placed This Encounter   Procedures    Inpatient Admission     Standing Status:   Standing     Number of Occurrences:   1     Order Specific Question:   Level of Care     Answer:   Med Surg [16]     Order Specific Question:   Estimated length of stay     Answer:   More than 2 Midnights     Order Specific Question:   Certification     Answer:   I certify that inpatient services are medically necessary for this patient for a duration of greater than two midnights  See H&P and MD Progress Notes for additional information about the patient's course of treatment  ED Arrival Information     Expected Arrival Acuity Means of Arrival Escorted By Service Admission Type    - 5/4/2021 08:08 Emergent Ambulance [facility] General Medicine Emergency    Arrival Complaint    Weakness         Chief Complaint   Patient presents with    Weakness - Generalized     last known well time was around 2000 last night       Initial Presentation:   78-year-old  Male who presents via ambulance with stroke-like symptoms including slurred speech and right-sided facial droop his last known normal time was 8:00 p m  last evening  EMS reports pt's room air sat was 86% but at that time he was not using his HOME OXYGEN (2L NC continuous/ COPD)  Covid exposure 2 days ago  Per Neuro exam in ER:  Patient's speech is minimally slurred initially; however resolves after speaking to examiner for several minutes  There is no facial droop appreciated on my exam and no other lateralizing neurologic deficits  NIHSS 2  CTH neg  CTA deferred d/t contrast allergy  EKG reveals New onset AFIB  COVID test POS     Non MI troponin elevation, trop 0 11     Suspect patient's initial symptoms to be of toxic metabolic etiology due to COVID, superimposed on his fatigue in the setting of his Afib with RVR  No further stroke work-up necessary at this time    Admit IP status,  Ms level of care: Will complete r/o CVA, manage new onset Afib,   and Closely  Monitor  Resp status in setting of Covid POS  Continuous heparin gtt, consult cardiology & Neuro  Start statin,  Maintain telemetry  Continuous pulse oximetry w/supplemental oxygen prn  Complete STROKE R/O (including serial neuro cks, Telemetry, PT/OT/Sp evals, ECHO)   Allow permissive HTN  Trend trops  Implement mild COVID pathway: trend bnp, inflammatory markers, Place on IV ceftriaxone and doxycycline, DC if procalcitonin negative x 2  Initiate Vit/zinc/statin therapies  Start IV Dexamethasone /remdesivir   give tocilizumab if sustained CRP > 90 with hypoxia > 12hrs     Date:   5/5   Day 2:  CARDIOLOGY  EKG on admission revealed atrial fibrillation  He has subsequently converted to NSR  no known h/o atrial fibrillation  Cont metoprolol and Eliquis, telemetry  OP fup    INTERNAL MED:   Sp therapist - reg diet ok  IV ceftriaxone and doxycycline discontinued, procalcitonin negative x 2  Day 2   IV dexamethasone and IV remdesivir  Continues to require 4-6 L Oxygen per NC; Trop peaked at 0 15, maintaining NSR         ED Triage Vitals   Temperature Pulse Respirations Blood Pressure SpO2   05/04/21 0815 05/04/21 0830 05/04/21 0815 05/04/21 0815 05/04/21 0815   99 9 °F (37 7 °C) 94 20 130/82 98 %      Temp Source Heart Rate Source Patient Position - Orthostatic VS BP Location FiO2 (%)   05/04/21 0815 05/04/21 0830 05/04/21 0815 05/04/21 0815 --   Oral Monitor Lying Left arm       Pain Score       05/04/21 1500       No Pain          Wt Readings from Last 1 Encounters:   05/04/21 54 4 kg (120 lb)     Additional Vital Signs:   05/05/21 0900  --  --  --  --  --  94 %  44  6 L/min  Nasal cannula  --   05/05/21 08:21:28  98 °F (36 7 °C)  79  18  127/73  91  94 %  --  --  --  --   05/04/21 2300  --  68  --  124/72  89  95 % 38  4 5 L/min  --  --   05/04/21 15:08:01  98 7 °F (37 1 °C)  72  18  125/71  89  91 %  --  --  --  --   05/04/21 1500  --  --  --  --  --  --  44  6 L/min  None (Room air)  --   05/04/21 1339  --  113Abnormal   29Abnormal   170/72  104  94 %  36  4 L/min  Nasal cannula  Lying   05/04/21 1328  --  88  --  132/71  --  --  --  --  --  --   05/04/21 1324  --  96  23Abnormal   --  --  90 %  --  --  --  --   05/04/21 1130  --  109Abnormal   18  171/71Abnormal   102  98 %  36  4 L/min  Nasal cannula  --   05/04/21 1100  --  105  22  134/69  --  95 %  --  --  --  --   05/04/21 1030  --  101  22  139/69  --  94 %  --  --  --  Lying   05/04/21 09:36:36  98 4 °F (36 9 °C)  86  18  148/83  --  91 %  --  --  --  Lying   05/04/21 0931  --  --  --  --  --  --  --  --  Nasal cannula  --   05/04/21 0915  --  106Abnormal   22  146/79  109  91 %  36  4 L/min  Nasal cannula  --   05/04/21 09:00:43  --  108Abnormal   21  127/72  --  93 %  --  --  --  Lying   05/04/21 0845  --  116Abnormal   25Abnormal   156/75  --  95 %  --  --  --  Lying   05/04/21 0830  --  94  20  138/81  --  96 %  --  --  --  Lying         Pertinent Labs/Diagnostic Test Results:   5/4  Chest Xray - severe emphysema, no acute cardiopulmonary diseae  5/4  CT Brain:  No acute disease   Diffuse generalized volume loss, moderate grade chronic small vessel disease         Results from last 7 days   Lab Units 05/04/21  0820   SARS-COV-2  Positive*     Results from last 7 days   Lab Units 05/05/21  0539 05/04/21  0820   WBC Thousand/uL 1 97* 3 32*   HEMOGLOBIN g/dL 13 2 13 7   HEMATOCRIT % 38 8 40 6   PLATELETS Thousands/uL 166 169   BANDS PCT % 2  --          Results from last 7 days   Lab Units 05/05/21  0539 05/04/21  0820   SODIUM mmol/L 140 141   POTASSIUM mmol/L 3 8 3 5   CHLORIDE mmol/L 103 102   CO2 mmol/L 25 29   ANION GAP mmol/L 12 10   BUN mg/dL 39* 21   CREATININE mg/dL 1 27 1 23   EGFR ml/min/1 73sq m 53 55   CALCIUM mg/dL 8 2* 9 0     Results from last 7 days Lab Units 05/05/21  0539   AST U/L 35   ALT U/L 18   ALK PHOS U/L 95   TOTAL PROTEIN g/dL 6 2*   ALBUMIN g/dL 2 7*   TOTAL BILIRUBIN mg/dL 0 50     Results from last 7 days   Lab Units 05/04/21  0837   POC GLUCOSE mg/dl 90     Results from last 7 days   Lab Units 05/05/21  0539 05/04/21  0820   GLUCOSE RANDOM mg/dL 100 87         Results from last 7 days   Lab Units 05/05/21  0539   HEMOGLOBIN A1C % 5 1   EAG mg/dl 100     Results from last 7 days   Lab Units 05/04/21  1338   CK TOTAL U/L 139     Results from last 7 days   Lab Units 05/04/21  2038 05/04/21  1546 05/04/21  1338 05/04/21  0820   TROPONIN I ng/mL 0 13* 0 14* 0 15* 0 11*     Results from last 7 days   Lab Units 05/04/21  1338   D-DIMER QUANTITATIVE ug/ml FEU 3 15*     Results from last 7 days   Lab Units 05/04/21  1547 05/04/21  0820   PROTIME seconds  --  12 7   INR   --  0 95   PTT seconds 126* 36         Results from last 7 days   Lab Units 05/05/21  0539 05/04/21  1338   PROCALCITONIN ng/ml <0 05 <0 05     Results from last 7 days   Lab Units 05/04/21  1338   NT-PRO BNP pg/mL 4,709*     Results from last 7 days   Lab Units 05/04/21  1338   FERRITIN ng/mL 592*     Results from last 7 days   Lab Units 05/04/21  1338   HEP B S AG  Non-reactive   HEP C AB  Non-reactive   HEP B C IGM  Non-reactive   HEP B C TOTAL AB  Non-reactive         Results from last 7 days   Lab Units 05/04/21  1338   CRP mg/L 70 6*         ED Treatment:   Medication Administration from 05/04/2021 0807 to 05/04/2021 1437       Date/Time Order Dose Route Action     05/04/2021 0928 heparin (porcine) 25,000 units in 0 45% NaCl 250 mL infusion (premix) 15 Units/kg/hr Intravenous New Bag     05/04/2021 1328 metoprolol tartrate (LOPRESSOR) tablet 25 mg 25 mg Oral Given     05/04/2021 1405 cefTRIAXone (ROCEPHIN) IVPB (premix in dextrose) 1,000 mg 50 mL 1,000 mg Intravenous New Bag     05/04/2021 1663 dexamethasone (DECADRON) injection 6 mg 6 mg Intravenous Given        Past Medical History:   Diagnosis Date    [facility]) ([facility])     [facility] [address] [facility]     Kidney failure     MI, old      Present on Admission:   Dysarthria   Essential hypertension   Tobacco abuse   Paroxysmal atrial fibrillation ([facility] [address] )   COVID-19   COPD (chronic obstructive pulmonary disease) ([facility])      Admitting Diagnosis: Weakness [R53 1]  Stroke (cerebrum) ([facility]) [I63 9]  New onset atrial fibrillation ([facility]) [I48 91]  Elevated troponin [R77 8]  COVID-19 [U07 1]  Age/Sex: 79 y o  male  Admission Orders:    Admit  Tele ,  Consult neurology/ cardiology, PT/OT/Speech   evals & treat  VS/Neuro cks q 1 hr x4;  q 2 hr x4;  q 4 hrs ;  Dysphagia assessment prior to po  Baseline NIH scale;  I/O q shift ; ECHO;  frequent pulse ox cks w/supplemental oxygen prn;  Hourly inc spirometry; Enc proning  IP CONSULT TO PHYSICAL MEDICINE REHAB  IP CONSULT TO CASE MANAGEMENT  IP CONSULT TO NUTRITION SERVICES      Scheduled Medications:  apixaban, 2 5 mg, Oral, BID  ascorbic acid, 1,000 mg, Oral, Q12H MARIA LUZ  aspirin, 81 mg, Oral, Daily  atorvastatin, 40 mg, Oral, HS  budesonide-formoterol, 2 puff, Inhalation, BID  cholecalciferol, 2,000 Units, Oral, Daily  cilostazol, 100 mg, Oral, BID  dexamethasone, 6 mg, Intravenous, Q24H  erythromycin, 0 5 inch, Both Eyes, Q8H MARIA LUZ  famotidine, 20 mg, Oral, Daily  metoprolol tartrate, 25 mg, Oral, Q12H MARIA LUZ  mirtazapine, 30 mg, Oral, HS  nicotine, 1 patch, Transdermal, Daily  remdesivir, 100 mg, Intravenous, Q24H  risperiDONE, 0 5 mg, Oral, Daily  zinc sulfate, 220 mg, Oral, Daily      Continuous IV Infusions:     PRN Meds:  acetaminophen, 975 mg, Oral, Q6H PRN  ondansetron, 4 mg, Oral, Q6H PRN  oxyCODONE, 5 mg, Oral, TID PRN        Network Utilization Review Department  ATTENTION: Please call with any questions or concerns to [phone] and carefully listen to the prompts so that you are directed to the right person   All voicemails are confidential   Saundra Doing all requests for admission clinical reviews, approved or denied determinations and any other requests to dedicated fax number below belonging to the campus where the patient is receiving treatment   List of dedicated fax numbers for the Facilities:  [address] [place] [address] DENIALS (Administrative/Medical Necessity) [phone]   [address] (Maternity/NICU/Pediatrics) [phone]   [address] [facility] [address] [place] [address] Tati Jesus [place] [phone] [phone]

## 2021-05-05 NOTE — PLAN OF CARE
Problem: Neurological Deficit  Goal: Neurological status is stable or improving  Description: Interventions:  - Monitor and assess patient's level of consciousness, motor function, sensory function, and level of assistance needed for ADLs  - Monitor and report changes from baseline  Collaborate with interdisciplinary team to initiate plan and implement interventions as ordered  - Provide and maintain a safe environment  - Consider seizure precautions  - Consider fall precautions  - Consider aspiration precautions  - Consider bleeding precautions  Outcome: Progressing     Problem: Activity Intolerance/Impaired Mobility  Goal: Mobility/activity is maintained at optimum level for patient  Description: Interventions:  - Assess and monitor patient  barriers to mobility and need for assistive/adaptive devices  - Assess patient's emotional response to limitations  - Collaborate with interdisciplinary team and initiate plans and interventions as ordered  - Encourage independent activity per ability   - Maintain proper body alignment  - Perform active/passive rom as tolerated/ordered  - Plan activities to conserve energy   - Turn patient as appropriate  Outcome: Progressing     Problem: Communication Impairment  Goal: Ability to express needs and understand communication  Description: Assess patient's communication skills and ability to understand information  Patient will demonstrate use of effective communication techniques, alternative methods of communication and understanding even if not able to speak  - Encourage communication and provide alternate methods of communication as needed  - Collaborate with case management/ for discharge needs  - Include patient/family/caregiver in decisions related to communication    Outcome: Progressing     Problem: Potential for Aspiration  Goal: Non-ventilated patient's risk of aspiration is minimized  Description: Assess and monitor vital signs, respiratory status, and labs (WBC)  Monitor for signs of aspiration (tachypnea, cough, rales, wheezing, cyanosis, fever)  - Assess and monitor patient's ability to swallow  - Place patient up in chair to eat if possible  - HOB up at 90 degrees to eat if unable to get patient up into chair   - Supervise patient during oral intake  - Instruct patient/ family to take small bites  - Instruct patient/ family to take small single sips when taking liquids  - Follow patient-specific strategies generated by speech pathologist   Outcome: Progressing  Goal: Ventilated patient's risk of aspiration is minimized  Description: Assess and monitor vital signs, respiratory status, airway cuff pressure, and labs (WBC)  Monitor for signs of aspiration (tachypnea, cough, rales, wheezing, cyanosis, fever)  - Elevate head of bed 30 degrees if patient has tube feeding   - Monitor tube feeding  Outcome: Progressing     Problem: Nutrition  Goal: Nutrition/Hydration status is improving  Description: Monitor and assess patient's nutrition/hydration status for malnutrition (ex- brittle hair, bruises, dry skin, pale skin and conjunctiva, muscle wasting, smooth red tongue, and disorientation)  Collaborate with interdisciplinary team and initiate plan and interventions as ordered  Monitor patient's weight and dietary intake as ordered or per policy  Utilize nutrition screening tool and intervene per policy  Determine patient's food preferences and provide high-protein, high-caloric foods as appropriate  - Assist patient with eating   - Allow adequate time for meals   - Encourage patient to take dietary supplement as ordered  - Collaborate with clinical nutritionist   - Include patient/family/caregiver in decisions related to nutrition    Outcome: Progressing     Problem: PAIN - ADULT  Goal: Verbalizes/displays adequate comfort level or baseline comfort level  Description: Interventions:  - Encourage patient to monitor pain and request assistance  - Assess pain using appropriate pain scale  - Administer analgesics based on type and severity of pain and evaluate response  - Implement non-pharmacological measures as appropriate and evaluate response  - Consider cultural and social influences on pain and pain management  - Notify physician/advanced practitioner if interventions unsuccessful or patient reports new pain  Outcome: Progressing     Problem: INFECTION - ADULT  Goal: Absence or prevention of progression during hospitalization  Description: INTERVENTIONS:  - Assess and monitor for signs and symptoms of infection  - Monitor lab/diagnostic results  - Monitor all insertion sites, i e  indwelling lines, tubes, and drains  - Monitor endotracheal if appropriate and nasal secretions for changes in amount and color  - [place]red appropriate cooling/warming therapies per order  - Administer medications as ordered  - Instruct and encourage patient and family to use good hand hygiene technique  - Identify and instruct in appropriate isolation precautions for identified infection/condition  Outcome: Progressing  Goal: Absence of fever/infection during neutropenic period  Description: INTERVENTIONS:  - Monitor WBC    Outcome: Progressing     Problem: SAFETY ADULT  Goal: Patient will remain free of falls  Description: INTERVENTIONS:  - Assess patient frequently for physical needs  -  Identify cognitive and physical deficits and behaviors that affect risk of falls    -  [place] fall precautions as indicated by assessment   - Educate patient/family on patient safety including physical limitations  - Instruct patient to call for assistance with activity based on assessment  - Modify environment to reduce risk of injury  - Consider OT/PT consult to assist with strengthening/mobility  Outcome: Progressing  Goal: Maintain or return to baseline ADL function  Description: INTERVENTIONS:  -  Assess patient's ability to carry out ADLs; assess patient's baseline for ADL function and identify physical deficits which impact ability to perform ADLs (bathing, care of mouth/teeth, toileting, grooming, dressing, etc )  - Assess/evaluate cause of self-care deficits   - Assess range of motion  - Assess patient's mobility; develop plan if impaired  - Assess patient's need for assistive devices and provide as appropriate  - Encourage maximum independence but intervene and supervise when necessary  - Involve family in performance of ADLs  - Assess for home care needs following discharge   - Consider OT consult to assist with ADL evaluation and planning for discharge  - Provide patient education as appropriate  Outcome: Progressing  Goal: Maintain or return mobility status to optimal level  Description: INTERVENTIONS:  - Assess patient's baseline mobility status (ambulation, transfers, stairs, etc )    - Identify cognitive and physical deficits and behaviors that affect mobility  - Identify mobility aids required to assist with transfers and/or ambulation (gait belt, sit-to-stand, lift, walker, cane, etc )  - [place] fall precautions as indicated by assessment  - Record patient progress and toleration of activity level on Mobility SBAR; progress patient to next Phase/Stage  - Instruct patient to call for assistance with activity based on assessment  - Consider rehabilitation consult to assist with strengthening/weightbearing, etc   Outcome: Progressing     Problem: DISCHARGE PLANNING  Goal: Discharge to home or other facility with appropriate resources  Description: INTERVENTIONS:  - Identify barriers to discharge w/patient and caregiver  - Arrange for needed discharge resources and transportation as appropriate  - Identify discharge learning needs (meds, wound care, etc )  - Arrange for interpretive services to assist at discharge as needed  - Refer to Case Management Department for coordinating discharge planning if the patient needs post-hospital services based on physician/advanced practitioner order or complex needs related to functional status, cognitive ability, or social support system  Outcome: Progressing     Problem: Knowledge Deficit  Goal: Patient/family/caregiver demonstrates understanding of disease process, treatment plan, medications, and discharge instructions  Description: Complete learning assessment and assess knowledge base  Interventions:  - Provide teaching at level of understanding  - Provide teaching via preferred learning methods  Outcome: Progressing     Problem: Potential for Falls  Goal: Patient will remain free of falls  Description: INTERVENTIONS:  - Assess patient frequently for physical needs  -  Identify cognitive and physical deficits and behaviors that affect risk of falls    -  [place] fall precautions as indicated by assessment   - Educate patient/family on patient safety including physical limitations  - Instruct patient to call for assistance with activity based on assessment  - Modify environment to reduce risk of injury  - Consider OT/PT consult to assist with strengthening/mobility  Outcome: Progressing     Problem: Prexisting or High Potential for Compromised Skin Integrity  Goal: Skin integrity is maintained or improved  Description: INTERVENTIONS:  - Identify patients at risk for skin breakdown  - Assess and monitor skin integrity  - Assess and monitor nutrition and hydration status  - Monitor labs   - Assess for incontinence   - Turn and reposition patient  - Assist with mobility/ambulation  - Relieve pressure over bony prominences  - Avoid friction and shearing  - Provide appropriate hygiene as needed including keeping skin clean and dry  - Evaluate need for skin moisturizer/barrier cream  - Collaborate with interdisciplinary team   - Patient/family teaching  - Consider wound care consult   Outcome: Progressing

## 2021-05-05 NOTE — ASSESSMENT & PLAN NOTE
Patient presenting with slurred speech, right sided facial droop  Rule out acute CVA  NIHSS on admission - 2  CT head - No acute intracranial anomaly  CTA head/neck- Not done due to contrast allergy  Initially placed on stroke protocol, but this was discontinued by neurology as his symptoms were attributed to COVID, superimposed on fatigue in the setting of rapid afib  Did not receive tpa - out of time window  Check MRI brain   Check ECHO

## 2021-05-05 NOTE — ASSESSMENT & PLAN NOTE
Acute on chronic hypoxic respiratory failure, poa, a/e/b tachypnea RR 25, sob, and hypoxia reportedly hypoxic to the 80's (not recorded) in the ER, requiring 6L NC secondary to COVID 19  Usually on 2L NC chronically  Currently on covid protocol  Chest Xray - severe emphysema, no acute cardiopulmonary disease  Encourage incentive spirometry, proning  Wean oxygen as tolerated

## 2021-05-06 LAB — CRP SERPL QL: 58.4 MG/L

## 2021-05-06 PROCEDURE — 86140 C-REACTIVE PROTEIN: CPT | Performed by: INTERNAL MEDICINE

## 2021-05-06 PROCEDURE — 99232 SBSQ HOSP IP/OBS MODERATE 35: CPT | Performed by: INTERNAL MEDICINE

## 2021-05-06 PROCEDURE — 97163 PT EVAL HIGH COMPLEX 45 MIN: CPT

## 2021-05-06 PROCEDURE — 97530 THERAPEUTIC ACTIVITIES: CPT

## 2021-05-06 RX ADMIN — Medication 1 PATCH: at 08:41

## 2021-05-06 RX ADMIN — OXYCODONE HYDROCHLORIDE AND ACETAMINOPHEN 1000 MG: 500 TABLET ORAL at 08:41

## 2021-05-06 RX ADMIN — RISPERIDONE 0.5 MG: 0.5 TABLET ORAL at 08:41

## 2021-05-06 RX ADMIN — ZINC SULFATE 220 MG (50 MG) CAPSULE 220 MG: CAPSULE at 08:41

## 2021-05-06 RX ADMIN — CILOSTAZOL 100 MG: 50 TABLET ORAL at 18:15

## 2021-05-06 RX ADMIN — Medication 2000 UNITS: at 08:41

## 2021-05-06 RX ADMIN — METOPROLOL TARTRATE 25 MG: 25 TABLET, FILM COATED ORAL at 08:41

## 2021-05-06 RX ADMIN — ASPIRIN 81 MG: 81 TABLET, COATED ORAL at 08:41

## 2021-05-06 RX ADMIN — APIXABAN 2.5 MG: 2.5 TABLET, FILM COATED ORAL at 08:40

## 2021-05-06 RX ADMIN — ATORVASTATIN CALCIUM 40 MG: 40 TABLET, FILM COATED ORAL at 21:41

## 2021-05-06 RX ADMIN — MIRTAZAPINE 30 MG: 15 TABLET, FILM COATED ORAL at 21:41

## 2021-05-06 RX ADMIN — APIXABAN 2.5 MG: 2.5 TABLET, FILM COATED ORAL at 18:15

## 2021-05-06 RX ADMIN — CILOSTAZOL 100 MG: 50 TABLET ORAL at 08:41

## 2021-05-06 RX ADMIN — METOPROLOL TARTRATE 25 MG: 25 TABLET, FILM COATED ORAL at 21:41

## 2021-05-06 RX ADMIN — DEXAMETHASONE SODIUM PHOSPHATE 6 MG: 4 INJECTION, SOLUTION INTRA-ARTICULAR; INTRALESIONAL; INTRAMUSCULAR; INTRAVENOUS; SOFT TISSUE at 14:02

## 2021-05-06 RX ADMIN — ERYTHROMYCIN 0.5 INCH: 5 OINTMENT OPHTHALMIC at 14:02

## 2021-05-06 RX ADMIN — OXYCODONE HYDROCHLORIDE AND ACETAMINOPHEN 1000 MG: 500 TABLET ORAL at 21:41

## 2021-05-06 RX ADMIN — BUDESONIDE AND FORMOTEROL FUMARATE DIHYDRATE 2 PUFF: 160; 4.5 AEROSOL RESPIRATORY (INHALATION) at 08:41

## 2021-05-06 RX ADMIN — ERYTHROMYCIN 0.5 INCH: 5 OINTMENT OPHTHALMIC at 21:41

## 2021-05-06 RX ADMIN — REMDESIVIR 100 MG: 100 INJECTION, POWDER, LYOPHILIZED, FOR SOLUTION INTRAVENOUS at 14:02

## 2021-05-06 RX ADMIN — BUDESONIDE AND FORMOTEROL FUMARATE DIHYDRATE 2 PUFF: 160; 4.5 AEROSOL RESPIRATORY (INHALATION) at 19:08

## 2021-05-06 RX ADMIN — FAMOTIDINE 20 MG: 20 TABLET, FILM COATED ORAL at 08:41

## 2021-05-06 NOTE — ASSESSMENT & PLAN NOTE
Patient diagnosed with COVID after he was noted to be hypoxic on admission, requiring 6L NC  Place on moderate covid protocol  continues to require 6L NC - unable to wean down, not a candidate for actemra, given CRP levels < 90  EKG - afib w/RVR  Cardiac markers proBNP 4709, , Troponin 0 11  hepatitis panel normal  IV ceftriaxone and doxycycline discontinued, procalcitonin negative x 2  on Vit D3, Vit C, Zinc, multivitamin, atorvastatin  On IV dexamethasone and IV remdesivir  Would give tocilizumab if sustained CRP > 90 with hypoxia > 12hrs  Encourage proning, incentive spirometry    Trend Inflammatory markers q48hrs;  Lab Results   Component Value Date/Time    DDIMER 3 15 (H) 05/04/2021 01:38 PM    CRP 70 6 (H) 05/04/2021 01:38 PM    FERRITIN 592 (H) 05/04/2021 01:38 PM

## 2021-05-06 NOTE — ASSESSMENT & PLAN NOTE
Non MI troponin elevation, trop 0 11 secondary to rapid afib and covid infection  Trop peaked at 0 15  EKG - Rapid Afib intially now NSR

## 2021-05-06 NOTE — CASE MANAGEMENT
Continue to follow  Faxed PT/OT and requested information to [facility] ([facility]) for home PT/OT  at [phone]  CM to follow

## 2021-05-06 NOTE — ASSESSMENT & PLAN NOTE
Paroxysmal afib, in rapid afib on admission  Patient's daughter reports past intolerance to blood thinners - was bleeding  EKG Afib ,   CHADVASC score 4  s/p IV heparin, now on Eliquis  On metoprolol 25mg BID and stable in normal sinus rhythm  Cardiology on board

## 2021-05-06 NOTE — OCCUPATIONAL THERAPY NOTE
Occupational Therapy Screen     Patient Name: Ya ABEBEIADEEL Date: 5/6/2021  Problem List  Principal Problem:    Dysarthria  Active Problems:    Essential hypertension    COPD (chronic obstructive pulmonary disease) ([facility])    Tobacco abuse    Paroxysmal atrial fibrillation ([facility] )    COVID-19    Sepsis due to COVID-19 ([facility] )    Elevated troponin    Acute respiratory failure with hypoxia (HCC)    PAD (peripheral artery disease) ([facility] )    Past Medical History  Past Medical History:   Diagnosis Date    COPD [facility]chronic obstructive [facility] disease) [facility]     Emphysema lung ([facility]     Kidney failure     MI, old      Past Surgical History  Past Surgical History:   Procedure Laterality Date    BACK SURGERY      BELOW KNEE LEG AMPUTATION      CARDIAC SURGERY      HERNIA REPAIR           05/06/21 1438   OT Last Visit   OT Visit Date 05/06/21   Note Type   Note type Screen   Assessment   Assessment OT orders received  Chart reviewed  At baseline, pt lives in basement apt of daughter's home, with stair lift access  Family lives upstairs and provides assistance to pt for ADLs as needed  Family provides all meals and transportation  Pt evaluated by PT and performed transfers with supervision  Per PT, pt appears to be at functional baseline  Has various DME including wheelchair, walker, cane, shower chair, and commode   No acute OT needs indicated at this time  Please reconsult if any changes arise              TOÑO Decker/GISELA

## 2021-05-06 NOTE — PROGRESS NOTES
[place]  Progress Note - Marijane Bernheim 1940, 79 y o  male MRN: [id number]  Unit/Bed#: -01 Encounter: [id number]  Primary Care Provider: No primary care provider on file     Date and time admitted to hospital: 5/4/2021  8:08 AM    COVID-19  Assessment & Plan  Patient diagnosed with COVID after he was noted to be hypoxic on admission, requiring 6L NC  Place on moderate covid protocol  continues to require 6L NC - unable to wean down, not a candidate for actemra, given CRP levels < 90  EKG - afib w/RVR  Cardiac markers proBNP 4709, , Troponin 0 11  hepatitis panel normal  IV ceftriaxone and doxycycline discontinued, procalcitonin negative x 2  on Vit D3, Vit C, Zinc, multivitamin, atorvastatin  On IV dexamethasone and IV remdesivir  Would give tocilizumab if sustained CRP > 90 with hypoxia > 12hrs  Encourage proning, incentive spirometry    Trend Inflammatory markers q48hrs;  Lab Results   Component Value Date/Time    DDIMER 3 15 (H) 05/04/2021 01:38 PM    CRP 70 6 (H) 05/04/2021 01:38 PM    FERRITIN 592 (H) 05/04/2021 01:38 PM       Paroxysmal atrial fibrillation (HCC)  Assessment & Plan  Paroxysmal afib, in rapid afib on admission  Patient's daughter reports past intolerance to blood thinners - was bleeding  EKG Afib ,   CHADVASC score 4  s/p IV heparin, now on Eliquis  On metoprolol 25mg BID and stable in normal sinus rhythm  Cardiology on board    * Dysarthria  Assessment & Plan  Patient presenting with slurred speech, right sided facial droop - now resolved  Acute CVA ruled out  NIHSS on admission - 2  CT head - No acute intracranial anomaly  CTA head/neck- Not done due to contrast allergy  Initially placed on stroke protocol, but this was discontinued by neurology as his symptoms were attributed to COVID, superimposed on fatigue in the setting of rapid afib  Did not receive tpa - out of time window  MRI brain - no acute infarct, chronic microangiopathic changes  ECHO not done due to COVID status    PAD (peripheral artery disease) ([facility] [address] )  Assessment & Plan  History of PAD s/p left BKA  On cilostazol and aspirin    Acute respiratory failure with hypoxia (HCC)  Assessment & Plan  Acute on chronic hypoxic respiratory failure, poa, a/e/b tachypnea RR 25, sob, and hypoxia reportedly hypoxic to the 80's (not recorded) in the ER, requiring 6L NC secondary to COVID 19  Usually on 2L NC chronically  Currently on covid protocol  Chest Xray - severe emphysema, no acute cardiopulmonary disease  Encourage incentive spirometry, proning  Wean oxygen as tolerated      Elevated troponin  Assessment & Plan  Non MI troponin elevation, trop 0 11 secondary to rapid afib and covid infection  Trop peaked at 0 15  EKG - Rapid Afib intially now NSR      Sepsis due to COVID-19 [facility])  Assessment & Plan  Sepsis, poa, a/e/b tachycardia 116, tachypnea RR 25 due to covid 23  Mgt highlighted above    Tobacco abuse  Assessment & Plan  Smoking cessation counseling  Nicotine patch ordered      COPD (chronic obstructive pulmonary disease) ([facility])  Assessment & Plan  Not in acute COPD exacerbation, chronically on 2L NC  Continue with home inhalers      Essential hypertension  Assessment & Plan  On amlodipine and chlorthalidone        VTE Pharmacologic Prophylaxis:   Pharmacologic: Apixaban (Eliquis)  Mechanical VTE Prophylaxis in Place: Yes    Patient Centered Rounds: I have performed bedside rounds with nursing staff today  Discussions with Specialists or Other Care Team Provider: CM    Education and Discussions with Family / Patient: I called and updated daughter    Time Spent for Care: 30 minutes  More than 50% of total time spent on counseling and coordination of care as described above      Current Length of Stay: 2 day(s)    Current Patient Status: Inpatient   Certification Statement: The patient will continue to require additional inpatient hospital stay due to as above    Discharge Plan: 1-2days    Code Status: Level 3 - DNAR and DNI      Subjective:   No overnight events, feeling better today, no complaints    Objective:     Vitals:   Temp (24hrs), Av 6 °F (36 4 °C), Min:97 5 °F (36 4 °C), Max:97 7 °F (36 5 °C)    Temp:  [97 5 °F (36 4 °C)-97 7 °F (36 5 °C)] 97 5 °F (36 4 °C)  HR:  [62-68] 62  Resp:  [20] 20  BP: (113-119)/(62-66) 113/62  SpO2:  [94 %-98 %] 98 %  Body mass index is 18 79 kg/m²  Input and Output Summary (last 24 hours): Intake/Output Summary (Last 24 hours) at 2021 0828  Last data filed at 2021 3033  Gross per 24 hour   Intake 1350 ml   Output 650 ml   Net 700 ml       Physical Exam:     Physical Exam  Vitals signs and nursing note reviewed  Constitutional:       General: He is not in acute distress  Appearance: Normal appearance  He is not ill-appearing, toxic-appearing or diaphoretic  Cardiovascular:      Rate and Rhythm: Normal rate and regular rhythm  Pulses: Normal pulses  Heart sounds: No murmur  No gallop  Pulmonary:      Effort: Pulmonary effort is normal  No respiratory distress  Breath sounds: Normal breath sounds  No stridor  No wheezing, rhonchi or rales  Chest:      Chest wall: No tenderness  Abdominal:      General: Bowel sounds are normal  There is no distension  Palpations: Abdomen is soft  Tenderness: There is no abdominal tenderness  There is no right CVA tenderness, left CVA tenderness or guarding  Musculoskeletal: Normal range of motion  General: Deformity (left BKA) present  No swelling  Right lower leg: No edema  Skin:     General: Skin is warm and dry  Capillary Refill: Capillary refill takes less than 2 seconds  Coloration: Skin is not jaundiced or pale  Findings: No bruising, erythema, lesion or rash  Neurological:      General: No focal deficit present  Mental Status: He is alert  Mental status is at baseline  Cranial Nerves: No cranial nerve deficit  Psychiatric:         Mood and Affect: Mood normal          Thought Content: Thought content normal        (Additional Data:     Labs:    Results from last 7 days   Lab Units 05/05/21  0539   WBC Thousand/uL 1 97*   HEMOGLOBIN g/dL 13 2   HEMATOCRIT % 38 8   PLATELETS Thousands/uL 166   BANDS PCT % 2   LYMPHO PCT % 28   MONO PCT % 15*   EOS PCT % 0     Results from last 7 days   Lab Units 05/05/21  0539   SODIUM mmol/L 140   POTASSIUM mmol/L 3 8   CHLORIDE mmol/L 103   CO2 mmol/L 25   BUN mg/dL 39*   CREATININE mg/dL 1 27   ANION GAP mmol/L 12   CALCIUM mg/dL 8 2*   ALBUMIN g/dL 2 7*   TOTAL BILIRUBIN mg/dL 0 50   ALK PHOS U/L 95   ALT U/L 18   AST U/L 35   GLUCOSE RANDOM mg/dL 100     Results from last 7 days   Lab Units 05/04/21  0820   INR  0 95     Results from last 7 days   Lab Units 05/04/21  0837   POC GLUCOSE mg/dl 90     Results from last 7 days   Lab Units 05/05/21  0539   HEMOGLOBIN A1C % 5 1     Results from last 7 days   Lab Units 05/05/21  0539 05/04/21  1338   PROCALCITONIN ng/ml <0 05 <0 05           * I Have Reviewed All Lab Data Listed Above  * Additional Pertinent Lab Tests Reviewed:  All Labs Within Last 24 Hours Reviewed    Imaging:    Imaging Reports Reviewed Today Include:   Imaging Personally Reviewed by Myself Includes:      Recent Cultures (last 7 days):           Last 24 Hours Medication List:   Current Facility-Administered Medications   Medication Dose Route Frequency Provider Last Rate    acetaminophen  975 mg Oral Q6H PRN Theo Truong MD      apixaban  2 5 mg Oral BID Theo Truong MD      ascorbic acid  1,000 mg Oral Q12H [address] Theo Truong MD      aspirin  81 mg Oral Daily Theo Truong MD      atorvastatin  40 mg Oral HS Theo Truong MD      budesonide-formoterol  2 puff Inhalation BID Theo Truong MD      cholecalciferol  2,000 Units Oral Daily Theo Truong MD      cilostazol  100 mg Oral BID MD Susan Minaya dexamethasone  6 mg Intravenous Q24H Tyrone Ely MD      erythromycin  0 5 inch Both Eyes Q8H Carla Farfan MD      famotidine  20 mg Oral Daily Tyrone Ely MD      metoprolol tartrate  25 mg Oral Q12H [address] Tyrone Ely MD      mirtazapine  30 mg Oral HS Tyrone Ely MD      nicotine  1 patch Transdermal Daily Tyrone Ely MD      ondansetron  4 mg Oral Q6H PRN Tyrone Ely MD      oxyCODONE  5 mg Oral TID PRN Tyrone Ely MD      remdesivir  100 mg Intravenous Q24H Tyrone Ely MD      risperiDONE  0 5 mg Oral Daily Tyrone Ely MD      zinc sulfate  220 mg Oral Daily Tyrone Ely MD          Today, Patient Was Seen By: Tyrone Ely MD    ** Please Note: Dictation voice to text software may have been used in the creation of this document   **

## 2021-05-06 NOTE — PHYSICAL THERAPY NOTE
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   Prior Function   Level of [place] Independent with ADLs and functional mobility   Lives With Family   Receives Help From Family   ADL Assistance Independent   IADLs Needs assistance   Falls in the last 6 months 0   Vocational Retired   Comments started wearing L AKA prosthesis 1 month ago able to amb   Restrictions/Precautions   Weight Bearing Precautions Per Order No   Other Precautions Contact/isolation; Airborne/isolation;Droplet precautions;O2   General   Additional Pertinent History adm with dysarthria, r/o cva, noted Covid + and in afib, now back in NSR, on 5L 02; 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 and rw, wc as well   Family/Caregiver Present No   Cognition   Overall Cognitive Status WFL   Arousal/Participation Alert   Orientation Level Oriented X4   Memory Within functional limits   Following Commands Follows all commands and directions without difficulty   Comments hopes to go home tomorrow   RUE Assessment   RUE Assessment WFL   LUE Assessment   LUE Assessment WFL   RLE Assessment   RLE Assessment WFL   LLE Assessment   LLE Assessment WFL  (AKA, hip wfl)   Coordination   Movements are Fluid and Coordinated 1   Proprioception   RLE Proprioception Grossly intact   LLE Proprioception Grossly Intact   Bed Mobility   Rolling R 7  Independent   Rolling L 7  Independent   Supine to Sit 5  Supervision   Transfers   Sit to Stand 5  Supervision   Additional items Armrests  (rw)   Stand to Sit 5  Supervision   Additional items Armrests  (rw)   Stand pivot 6  Modified independent   Additional items Armrests  (rw)   Ambulation/Elevation   Gait pattern Forward Flexion   Gait Assistance 6  Modified independent   Additional items Verbal cues   Assistive Device Rolling walker  (hopes L aka prosthesis not available)   Distance 5'   Balance   Static Sitting Normal   Dynamic Sitting Good   Static Standing Fair +   Dynamic Standing Fair +   Ambulatory Fair +   Endurance Deficit   Endurance Deficit   (02 sat 88% on room air after activity, replaced on 2L 02 95%)   Activity Tolerance   Activity Tolerance Patient tolerated treatment well   Nurse Made Aware RN Sil   Assessment   Prognosis Good   Problem List Decreased endurance   Assessment Pt  able to transfer oob to recliner with rw  L AKA  unavailable , but able to hop with rw  Anxious for home d/c  WIll see for 1 tx session for standing tolerance  Able to wean 02 down to 2L at this time  Pt already has home 02  appeasr to be at baseline for mobility without L AKA prosthesis  REcommend home PT OT as PTA     Barriers to Discharge   (meidcal clearance)   Goals   Patient Goals get better  go home   STG Expiration Date 05/06/21   Short Term Goal #1 1) safe ind transfers with rw 2) imrpove standing tolerance to 2 min no sob   Plan   Treatment/Interventions ADL retraining;Functional transfer training   PT Frequency Follow-up visit only   Recommendation   PT Discharge Recommendation Home with home health rehabilitation   PT - OK to Discharge Yes   Additional Comments   (after f/u visit adn wi medical clearance)   AM-PAC Basic Mobility Inpatient   Turning in Bed Without Bedrails 4   Lying on Back to Sitting on Edge of Flat Bed 4   Moving Bed to Chair 4   Standing Up From Chair 4   Walk in Room 4   Climb 3-5 Stairs 2   Basic Mobility Inpatient Raw Score 22   Basic Mobility Standardized Score 47 4   Modified Radford Scale   Modified Radford Scale 2   Emma Avery, PT  PT tx  Time in 1005  Times out 1015  PT tx continues for transfers with rw and supervision  Able to stand with rw and pull up briefs/pants and perfrom pericare vitor standing 2 min  02 sat dips to 88% on room air  02 reapplied @2L and seated rest to regain sat to 95%  Probe problematic with very cold hand at times  No sob or distress noted  Appropriate for home d/c once medically cleared  Perform IS without difficulty   D c PT no further needs

## 2021-05-06 NOTE — ASSESSMENT & PLAN NOTE
Patient presenting with slurred speech, right sided facial droop - now resolved  Acute CVA ruled out  NIHSS on admission - 2  CT head - No acute intracranial anomaly  CTA head/neck- Not done due to contrast allergy  Initially placed on stroke protocol, but this was discontinued by neurology as his symptoms were attributed to COVID, superimposed on fatigue in the setting of rapid afib  Did not receive tpa - out of time window  MRI brain - no acute infarct, chronic microangiopathic changes  ECHO not done due to COVID status

## 2021-05-06 NOTE — PLAN OF CARE
Problem: Neurological Deficit  Goal: Neurological status is stable or improving  Description: Interventions:  - Monitor and assess patient's level of consciousness, motor function, sensory function, and level of assistance needed for ADLs  - Monitor and report changes from baseline  Collaborate with interdisciplinary team to initiate plan and implement interventions as ordered  - Provide and maintain a safe environment  - Consider seizure precautions  - Consider fall precautions  - Consider aspiration precautions  - Consider bleeding precautions  Outcome: Progressing     Problem: Activity Intolerance/Impaired Mobility  Goal: Mobility/activity is maintained at optimum level for patient  Description: Interventions:  - Assess and monitor patient  barriers to mobility and need for assistive/adaptive devices  - Assess patient's emotional response to limitations  - Collaborate with interdisciplinary team and initiate plans and interventions as ordered  - Encourage independent activity per ability   - Maintain proper body alignment  - Perform active/passive rom as tolerated/ordered  - Plan activities to conserve energy   - Turn patient as appropriate  Outcome: Progressing     Problem: Communication Impairment  Goal: Ability to express needs and understand communication  Description: Assess patient's communication skills and ability to understand information  Patient will demonstrate use of effective communication techniques, alternative methods of communication and understanding even if not able to speak  - Encourage communication and provide alternate methods of communication as needed  - Collaborate with case management/ for discharge needs  - Include patient/family/caregiver in decisions related to communication    Outcome: Progressing     Problem: Potential for Aspiration  Goal: Non-ventilated patient's risk of aspiration is minimized  Description: Assess and monitor vital signs, respiratory status, and labs (WBC)  Monitor for signs of aspiration (tachypnea, cough, rales, wheezing, cyanosis, fever)  - Assess and monitor patient's ability to swallow  - Place patient up in chair to eat if possible  - HOB up at 90 degrees to eat if unable to get patient up into chair   - Supervise patient during oral intake  - Instruct patient/ family to take small bites  - Instruct patient/ family to take small single sips when taking liquids  - Follow patient-specific strategies generated by speech pathologist   Outcome: Progressing  Goal: Ventilated patient's risk of aspiration is minimized  Description: Assess and monitor vital signs, respiratory status, airway cuff pressure, and labs (WBC)  Monitor for signs of aspiration (tachypnea, cough, rales, wheezing, cyanosis, fever)  - Elevate head of bed 30 degrees if patient has tube feeding   - Monitor tube feeding  Outcome: Progressing     Problem: Nutrition  Goal: Nutrition/Hydration status is improving  Description: Monitor and assess patient's nutrition/hydration status for malnutrition (ex- brittle hair, bruises, dry skin, pale skin and conjunctiva, muscle wasting, smooth red tongue, and disorientation)  Collaborate with interdisciplinary team and initiate plan and interventions as ordered  Monitor patient's weight and dietary intake as ordered or per policy  Utilize nutrition screening tool and intervene per policy  Determine patient's food preferences and provide high-protein, high-caloric foods as appropriate  - Assist patient with eating   - Allow adequate time for meals   - Encourage patient to take dietary supplement as ordered  - Collaborate with clinical nutritionist   - Include patient/family/caregiver in decisions related to nutrition    Outcome: Progressing     Problem: PAIN - ADULT  Goal: Verbalizes/displays adequate comfort level or baseline comfort level  Description: Interventions:  - Encourage patient to monitor pain and request assistance  - Assess pain using appropriate pain scale  - Administer analgesics based on type and severity of pain and evaluate response  - Implement non-pharmacological measures as appropriate and evaluate response  - Consider cultural and social influences on pain and pain management  - Notify physician/advanced practitioner if interventions unsuccessful or patient reports new pain  Outcome: Progressing     Problem: INFECTION - ADULT  Goal: Absence or prevention of progression during hospitalization  Description: INTERVENTIONS:  - Assess and monitor for signs and symptoms of infection  - Monitor lab/diagnostic results  - Monitor all insertion sites, i e  indwelling lines, tubes, and drains  - Monitor endotracheal if appropriate and nasal secretions for changes in amount and color  - [place] appropriate cooling/warming therapies per order  - Administer medications as ordered  - Instruct and encourage patient and family to use good hand hygiene technique  - Identify and instruct in appropriate isolation precautions for identified infection/condition  Outcome: Progressing  Goal: Absence of fever/infection during neutropenic period  Description: INTERVENTIONS:  - Monitor WBC    Outcome: Progressing     Problem: SAFETY ADULT  Goal: Patient will remain free of falls  Description: INTERVENTIONS:  - Assess patient frequently for physical needs  -  Identify cognitive and physical deficits and behaviors that affect risk of falls    -  [place] fall precautions as indicated by assessment   - Educate patient/family on patient safety including physical limitations  - Instruct patient to call for assistance with activity based on assessment  - Modify environment to reduce risk of injury  - Consider OT/PT consult to assist with strengthening/mobility  Outcome: Progressing  Goal: Maintain or return to baseline ADL function  Description: INTERVENTIONS:  -  Assess patient's ability to carry out ADLs; assess patient's baseline for ADL function and identify physical deficits which impact ability to perform ADLs (bathing, care of mouth/teeth, toileting, grooming, dressing, etc )  - Assess/evaluate cause of self-care deficits   - Assess range of motion  - Assess patient's mobility; develop plan if impaired  - Assess patient's need for assistive devices and provide as appropriate  - Encourage maximum independence but intervene and supervise when necessary  - Involve family in performance of ADLs  - Assess for home care needs following discharge   - Consider OT consult to assist with ADL evaluation and planning for discharge  - Provide patient education as appropriate  Outcome: Progressing  Goal: Maintain or return mobility status to optimal level  Description: INTERVENTIONS:  - Assess patient's baseline mobility status (ambulation, transfers, stairs, etc )    - Identify cognitive and physical deficits and behaviors that affect mobility  - Identify mobility aids required to assist with transfers and/or ambulation (gait belt, sit-to-stand, lift, walker, cane, etc )  - [place] fall precautions as indicated by assessment  - Record patient progress and toleration of activity level on Mobility SBAR; progress patient to next Phase/Stage  - Instruct patient to call for assistance with activity based on assessment  - Consider rehabilitation consult to assist with strengthening/weightbearing, etc   Outcome: Progressing     Problem: DISCHARGE PLANNING  Goal: Discharge to home or other facility with appropriate resources  Description: INTERVENTIONS:  - Identify barriers to discharge w/patient and caregiver  - Arrange for needed discharge resources and transportation as appropriate  - Identify discharge learning needs (meds, wound care, etc )  - Arrange for interpretive services to assist at discharge as needed  - Refer to Case Management Department for coordinating discharge planning if the patient needs post-hospital services based on physician/advanced practitioner order or complex needs related to functional status, cognitive ability, or social support system  Outcome: Progressing     Problem: Knowledge Deficit  Goal: Patient/family/caregiver demonstrates understanding of disease process, treatment plan, medications, and discharge instructions  Description: Complete learning assessment and assess knowledge base  Interventions:  - Provide teaching at level of understanding  - Provide teaching via preferred learning methods  Outcome: Progressing     Problem: Potential for Falls  Goal: Patient will remain free of falls  Description: INTERVENTIONS:  - Assess patient frequently for physical needs  -  Identify cognitive and physical deficits and behaviors that affect risk of falls    -  [place] fall precautions as indicated by assessment   - Educate patient/family on patient safety including physical limitations  - Instruct patient to call for assistance with activity based on assessment  - Modify environment to reduce risk of injury  - Consider OT/PT consult to assist with strengthening/mobility  Outcome: Progressing     Problem: Prexisting or High Potential for Compromised Skin Integrity  Goal: Skin integrity is maintained or improved  Description: INTERVENTIONS:  - Identify patients at risk for skin breakdown  - Assess and monitor skin integrity  - Assess and monitor nutrition and hydration status  - Monitor labs   - Assess for incontinence   - Turn and reposition patient  - Assist with mobility/ambulation  - Relieve pressure over bony prominences  - Avoid friction and shearing  - Provide appropriate hygiene as needed including keeping skin clean and dry  - Evaluate need for skin moisturizer/barrier cream  - Collaborate with interdisciplinary team   - Patient/family teaching  - Consider wound care consult   Outcome: Progressing

## 2021-05-06 NOTE — PLAN OF CARE
Problem: PHYSICAL THERAPY ADULT  Goal: Performs mobility at highest level of function for planned discharge setting  See evaluation for individualized goals  Description: Treatment/Interventions: ADL retraining, Functional transfer training          See flowsheet documentation for full assessment, interventions and recommendations  Outcome: Adequate for Discharge  Note: Prognosis: Good  Problem List: Decreased endurance  Assessment: Pt  able to transfer oob to recliner with rw  L AKA  unavailable , but able to hop with rw  Anxious for home d/c  WIll see for 1 tx session for standing tolerance  Able to wean 02 down to 2L at this time  Pt already has home 02  appeasr to be at baseline for mobility without L AKA prosthesis  REcommend home PT OT as PTA  Barriers to Discharge: (meidcal clearance)        PT Discharge Recommendation: Home with home health rehabilitation     PT - OK to Discharge: Yes    See flowsheet documentation for full assessment

## 2021-05-07 VITALS
BODY MASS INDEX: 18.83 KG/M2 | HEART RATE: 70 BPM | HEIGHT: 67 IN | SYSTOLIC BLOOD PRESSURE: 125 MMHG | RESPIRATION RATE: 18 BRPM | OXYGEN SATURATION: 92 % | TEMPERATURE: 97.9 F | DIASTOLIC BLOOD PRESSURE: 85 MMHG | WEIGHT: 120 LBS

## 2021-05-07 PROBLEM — R47.1 DYSARTHRIA: Status: RESOLVED | Noted: 2021-05-04 | Resolved: 2021-05-07

## 2021-05-07 PROBLEM — R77.8 ELEVATED TROPONIN: Status: RESOLVED | Noted: 2021-05-04 | Resolved: 2021-05-07

## 2021-05-07 LAB
ANION GAP SERPL CALCULATED.3IONS-SCNC: 10 MMOL/L (ref 4–13)
BUN SERPL-MCNC: 50 MG/DL (ref 5–25)
CALCIUM SERPL-MCNC: 7.9 MG/DL (ref 8.3–10.1)
CHLORIDE SERPL-SCNC: 106 MMOL/L (ref 100–108)
CO2 SERPL-SCNC: 25 MMOL/L (ref 21–32)
CREAT SERPL-MCNC: 1.46 MG/DL (ref 0.6–1.3)
D DIMER PPP FEU-MCNC: 2.46 UG/ML FEU
ERYTHROCYTE [DISTWIDTH] IN BLOOD BY AUTOMATED COUNT: 13.9 % (ref 11.6–15.1)
FERRITIN SERPL-MCNC: 582 NG/ML (ref 8–388)
GFR SERPL CREATININE-BSD FRML MDRD: 44 ML/MIN/1.73SQ M
GLUCOSE SERPL-MCNC: 118 MG/DL (ref 65–140)
HCT VFR BLD AUTO: 33.7 % (ref 36.5–49.3)
HGB BLD-MCNC: 11.3 G/DL (ref 12–17)
MCH RBC QN AUTO: 32.2 PG (ref 26.8–34.3)
MCHC RBC AUTO-ENTMCNC: 33.5 G/DL (ref 31.4–37.4)
MCV RBC AUTO: 96 FL (ref 82–98)
PLATELET # BLD AUTO: 161 THOUSANDS/UL (ref 149–390)
PMV BLD AUTO: 10.4 FL (ref 8.9–12.7)
POTASSIUM SERPL-SCNC: 3.3 MMOL/L (ref 3.5–5.3)
RBC # BLD AUTO: 3.51 MILLION/UL (ref 3.88–5.62)
SODIUM SERPL-SCNC: 141 MMOL/L (ref 136–145)
WBC # BLD AUTO: 2.19 THOUSAND/UL (ref 4.31–10.16)

## 2021-05-07 PROCEDURE — 85027 COMPLETE CBC AUTOMATED: CPT | Performed by: INTERNAL MEDICINE

## 2021-05-07 PROCEDURE — 99239 HOSP IP/OBS DSCHRG MGMT >30: CPT | Performed by: INTERNAL MEDICINE

## 2021-05-07 PROCEDURE — 85379 FIBRIN DEGRADATION QUANT: CPT | Performed by: INTERNAL MEDICINE

## 2021-05-07 PROCEDURE — 82728 ASSAY OF FERRITIN: CPT | Performed by: INTERNAL MEDICINE

## 2021-05-07 PROCEDURE — 80048 BASIC METABOLIC PNL TOTAL CA: CPT | Performed by: INTERNAL MEDICINE

## 2021-05-07 RX ORDER — POTASSIUM CHLORIDE 20 MEQ/1
40 TABLET, EXTENDED RELEASE ORAL ONCE
Status: COMPLETED | OUTPATIENT
Start: 2021-05-07 | End: 2021-05-07

## 2021-05-07 RX ADMIN — APIXABAN 2.5 MG: 2.5 TABLET, FILM COATED ORAL at 08:42

## 2021-05-07 RX ADMIN — FAMOTIDINE 20 MG: 20 TABLET, FILM COATED ORAL at 08:42

## 2021-05-07 RX ADMIN — METOPROLOL TARTRATE 25 MG: 25 TABLET, FILM COATED ORAL at 08:42

## 2021-05-07 RX ADMIN — POTASSIUM CHLORIDE 40 MEQ: 1500 TABLET, EXTENDED RELEASE ORAL at 11:27

## 2021-05-07 RX ADMIN — CILOSTAZOL 100 MG: 50 TABLET ORAL at 08:42

## 2021-05-07 RX ADMIN — Medication 2000 UNITS: at 08:42

## 2021-05-07 RX ADMIN — OXYCODONE HYDROCHLORIDE AND ACETAMINOPHEN 1000 MG: 500 TABLET ORAL at 08:42

## 2021-05-07 RX ADMIN — Medication 1 PATCH: at 08:43

## 2021-05-07 RX ADMIN — RISPERIDONE 0.5 MG: 0.5 TABLET ORAL at 08:42

## 2021-05-07 RX ADMIN — BUDESONIDE AND FORMOTEROL FUMARATE DIHYDRATE 2 PUFF: 160; 4.5 AEROSOL RESPIRATORY (INHALATION) at 08:43

## 2021-05-07 RX ADMIN — ERYTHROMYCIN 0.5 INCH: 5 OINTMENT OPHTHALMIC at 05:21

## 2021-05-07 RX ADMIN — ASPIRIN 81 MG: 81 TABLET, COATED ORAL at 08:42

## 2021-05-07 RX ADMIN — ZINC SULFATE 220 MG (50 MG) CAPSULE 220 MG: CAPSULE at 08:42

## 2021-05-07 NOTE — ASSESSMENT & PLAN NOTE
Non MI troponin elevation, trop 0 11 secondary to rapid afib and covid infection  Trop peaked at 0 15  EKG - Rapid Afib intially now NSR  No chest pain

## 2021-05-07 NOTE — ASSESSMENT & PLAN NOTE
Acute on chronic hypoxic respiratory failure, poa, a/e/b tachypnea RR 25, sob, and hypoxia reportedly hypoxic to the 80's (not recorded) in the ER, requiring 6L NC secondary to COVID 19  Usually on 2L NC chronically  Currently on covid protocol - successfully weaned down to 2L NC and stable  Chest Xray - severe emphysema, no acute cardiopulmonary disease  Encourage incentive spirometry, proning  Wean oxygen as tolerated

## 2021-05-07 NOTE — DISCHARGE SUMMARY
[place]  Discharge- [facility] 1940, 79 y o  male MRN: [id number]  Unit/Bed#: -01 Encounter: [id number]  Primary Care Provider: No primary care provider on file  Date and time admitted to hospital: 5/4/2021  8:08 AM    COVID-19  Assessment & Plan  Patient diagnosed with COVID after he was noted to be hypoxic on admission, requiring 6L NC  Place on moderate covid protocol  Now successfully weaned down to 2L NC and stable  EKG - afib w/RVR  Cardiac markers proBNP 4709, , Troponin 0 11  hepatitis panel normal  IV ceftriaxone and doxycycline discontinued, procalcitonin negative x 2  on Vit D3, Vit C, Zinc, multivitamin, atorvastatin  On IV dexamethasone and IV remdesivir  Would give tocilizumab if sustained CRP > 90 with hypoxia > 12hrs  Encourage proning, incentive spirometry  Ddimer elevated at 2 46 - unable to get CT PE study given contrast allergy, already placed on eliquis for afib history   Would discharge on eliquis  Trend Inflammatory markers q48hrs;  Lab Results   Component Value Date/Time    DDIMER 2 46 (H) 05/07/2021 05:10 AM    DDIMER 3 15 (H) 05/04/2021 01:38 PM    CRP 58 4 (H) 05/06/2021 09:05 AM    CRP 70 6 (H) 05/04/2021 01:38 PM    FERRITIN 592 (H) 05/04/2021 01:38 PM       Paroxysmal atrial fibrillation (HCC)  Assessment & Plan  Paroxysmal afib, in rapid afib on admission  Patient's daughter reports past intolerance to blood thinners - was bleeding  EKG Afib ,   CHADVASC score 4  s/p IV heparin, now on Eliquis  On metoprolol 25mg BID and stable in normal sinus rhythm  Cardiology on board    * Dysarthria-resolved as of 5/7/2021  Assessment & Plan  Patient presenting with slurred speech, right sided facial droop - now resolved  Acute CVA ruled out  NIHSS on admission - 2  CT head - No acute intracranial anomaly  CTA head/neck- Not done due to contrast allergy  Initially placed on stroke protocol, but this was discontinued by neurology as his symptoms were attributed to COVID, superimposed on fatigue in the setting of rapid afib  Did not receive tpa - out of time window  MRI brain - no acute infarct, chronic microangiopathic changes  ECHO not done due to COVID status    PAD (peripheral artery disease) ([facility] [place] [address] )  Assessment & Plan  History of PAD s/p left BKA  On cilostazol and aspirin    Acute respiratory failure with hypoxia (HCC)  Assessment & Plan  Acute on chronic hypoxic respiratory failure, poa, a/e/b tachypnea RR 25, sob, and hypoxia reportedly hypoxic to the 80's (not recorded) in the ER, requiring 6L NC secondary to COVID 19  Usually on 2L NC chronically  Currently on covid protocol - successfully weaned down to 2L NC and stable  Chest Xray - severe emphysema, no acute cardiopulmonary disease  Encourage incentive spirometry, proning  Wean oxygen as tolerated      Sepsis due to COVID-19 [facility])  Assessment & Plan  Sepsis, poa, a/e/b tachycardia 116, tachypnea RR 25 due to covid 23  Mgt highlighted above    Tobacco abuse  Assessment & Plan  Smoking cessation counseling  Nicotine patch ordered      COPD (chronic obstructive pulmonary disease) ([facility] [place] [address] )  Assessment & Plan  Not in acute COPD exacerbation, chronically on 2L NC  Continue with home inhalers      Essential hypertension  Assessment & Plan  On amlodipine and chlorthalidone    Elevated troponin-resolved as of 5/7/2021  Assessment & Plan  Non MI troponin elevation, trop 0 11 secondary to rapid afib and covid infection  Trop peaked at 0 15  EKG - Rapid Afib intially now NSR  No chest pain          Discharging Physician / Practitioner: Celi Laurent MD  PCP: No primary care provider on file    Admission Date:   Admission Orders (From admission, onward)     Ordered        05/04/21 5957  Inpatient Admission  Once                   Discharge Date: 05/07/21    Resolved Problems  Date Reviewed: 5/6/2021          Resolved    * (Principal) Dysarthria 5/7/2021     Resolved by  Celi Laurent MD Elevated troponin 5/7/2021     Resolved by  Reginaldo Thibodeaux MD          Consultations During Hospital Stay:  · Cardiology  · Neurology    Procedures Performed:   ·     Significant Findings / Test Results:   · Mri brain -  White matter changes suggestive of chronic microangiopathy  No acute intracranial pathology    Incidental Findings:   ·      Test Results Pending at Discharge (will require follow up):   ·      Outpatient Tests Requested:  ·     Complications:      Reason for Admission: Slurred speech    Hospital Course:     Gian Boyce is a 79 y o  male patient with PMH of COPD on 2L NC who originally presented to the hospital on 5/4/2021 due to slurred speech and right sided facial droop, initial thought to be acute CVA, but on presentation, he was found to be in rapid Afib and his covid test returned positive  He was seen by Neurology and they attributed his stroke like symptoms to toxic metabolic etiology from the covid  His stroke work up returned negative and his symptoms resolved  He was placed on metoprolol and that was successful in controlling his heart rate, he remained in normal sinus rhythm and given his CHADVASC score of 4, he was placed on Eliquis  He was requiring 4-6L NC on presentation, and treated on the moderate covid protocol and he improved  He was successfully weaned down to his baseline oxygen requirement of 2L NC  Please see above list of diagnoses and related plan for additional information       Condition at Discharge: stable     Discharge Day Visit / Exam:     Subjective:  No overnight events, feels fine today  Vitals: Blood Pressure: 110/59 (05/07/21 0505)  Pulse: (!) 54 (05/07/21 0505)  Temperature: 98 1 °F (36 7 °C) (05/07/21 0505)  Temp Source: Oral (05/06/21 2100)  Respirations: 18 (05/07/21 0505)  Height: 5' 7" (170 2 cm) (05/04/21 1823)  Weight - Scale: 54 4 kg (120 lb) (05/04/21 0830)  SpO2: 96 % (05/07/21 0505)  Exam:   Physical Exam  Vitals signs and nursing note reviewed  Constitutional:       General: He is not in acute distress  Appearance: Normal appearance  He is not ill-appearing, toxic-appearing or diaphoretic  Cardiovascular:      Rate and Rhythm: Normal rate and regular rhythm  Pulses: Normal pulses  Heart sounds: Murmur present  Pulmonary:      Effort: Pulmonary effort is normal  No respiratory distress  Breath sounds: Normal breath sounds  No stridor  No wheezing, rhonchi or rales  Chest:      Chest wall: No tenderness  Abdominal:      General: Bowel sounds are normal  There is no distension  Palpations: Abdomen is soft  Tenderness: There is no abdominal tenderness  There is no right CVA tenderness, left CVA tenderness, guarding or rebound  Musculoskeletal: Normal range of motion  General: Deformity (left BKA) present  No swelling, tenderness or signs of injury  Right lower leg: No edema  Left lower leg: No edema  Skin:     General: Skin is warm and dry  Capillary Refill: Capillary refill takes less than 2 seconds  Coloration: Skin is not jaundiced or pale  Findings: No bruising, erythema, lesion or rash  Neurological:      Mental Status: He is alert and oriented to person, place, and time  Mental status is at baseline  Cranial Nerves: No cranial nerve deficit  Psychiatric:         Mood and Affect: Mood normal          Thought Content: Thought content normal          Judgment: Judgment normal        Discussion with Family: I updated patient's daughter    Discharge instructions/Information to patient and family:   See after visit summary for information provided to patient and family  Provisions for Follow-Up Care:  See after visit summary for information related to follow-up care and any pertinent home health orders        Disposition:     Home    For Discharges to [facility] SNF:   · Not Applicable to this Patient - Not Applicable to this Patient    Planned Readmission: no     Discharge Statement:  I spent 45 minutes discharging the patient  This time was spent on the day of discharge  I had direct contact with the patient on the day of discharge  Greater than 50% of the total time was spent examining patient, answering all patient questions, arranging and discussing plan of care with patient as well as directly providing post-discharge instructions  Additional time then spent on discharge activities  Discharge Medications:  See after visit summary for reconciled discharge medications provided to patient and family        ** Please Note: This note has been constructed using a voice recognition system **

## 2021-05-07 NOTE — PLAN OF CARE
Problem: Neurological Deficit  Goal: Neurological status is stable or improving  Description: Interventions:  - Monitor and assess patient's level of consciousness, motor function, sensory function, and level of assistance needed for ADLs  - Monitor and report changes from baseline  Collaborate with interdisciplinary team to initiate plan and implement interventions as ordered  - Provide and maintain a safe environment  - Consider seizure precautions  - Consider fall precautions  - Consider aspiration precautions  - Consider bleeding precautions  Outcome: Progressing     Problem: Activity Intolerance/Impaired Mobility  Goal: Mobility/activity is maintained at optimum level for patient  Description: Interventions:  - Assess and monitor patient  barriers to mobility and need for assistive/adaptive devices  - Assess patient's emotional response to limitations  - Collaborate with interdisciplinary team and initiate plans and interventions as ordered  - Encourage independent activity per ability   - Maintain proper body alignment  - Perform active/passive rom as tolerated/ordered  - Plan activities to conserve energy   - Turn patient as appropriate  Outcome: Progressing     Problem: Communication Impairment  Goal: Ability to express needs and understand communication  Description: Assess patient's communication skills and ability to understand information  Patient will demonstrate use of effective communication techniques, alternative methods of communication and understanding even if not able to speak  - Encourage communication and provide alternate methods of communication as needed  - Collaborate with case management/ for discharge needs  - Include patient/family/caregiver in decisions related to communication    Outcome: Progressing     Problem: Potential for Aspiration  Goal: Non-ventilated patient's risk of aspiration is minimized  Description: Assess and monitor vital signs, respiratory status, and labs (WBC)  Monitor for signs of aspiration (tachypnea, cough, rales, wheezing, cyanosis, fever)  - Assess and monitor patient's ability to swallow  - Place patient up in chair to eat if possible  - HOB up at 90 degrees to eat if unable to get patient up into chair   - Supervise patient during oral intake  - Instruct patient/ family to take small bites  - Instruct patient/ family to take small single sips when taking liquids  - Follow patient-specific strategies generated by speech pathologist   Outcome: Progressing  Goal: Ventilated patient's risk of aspiration is minimized  Description: Assess and monitor vital signs, respiratory status, airway cuff pressure, and labs (WBC)  Monitor for signs of aspiration (tachypnea, cough, rales, wheezing, cyanosis, fever)  - Elevate head of bed 30 degrees if patient has tube feeding   - Monitor tube feeding  Outcome: Progressing     Problem: Nutrition  Goal: Nutrition/Hydration status is improving  Description: Monitor and assess patient's nutrition/hydration status for malnutrition (ex- brittle hair, bruises, dry skin, pale skin and conjunctiva, muscle wasting, smooth red tongue, and disorientation)  Collaborate with interdisciplinary team and initiate plan and interventions as ordered  Monitor patient's weight and dietary intake as ordered or per policy  Utilize nutrition screening tool and intervene per policy  Determine patient's food preferences and provide high-protein, high-caloric foods as appropriate  - Assist patient with eating   - Allow adequate time for meals   - Encourage patient to take dietary supplement as ordered  - Collaborate with clinical nutritionist   - Include patient/family/caregiver in decisions related to nutrition    Outcome: Progressing     Problem: PAIN - ADULT  Goal: Verbalizes/displays adequate comfort level or baseline comfort level  Description: Interventions:  - Encourage patient to monitor pain and request assistance  - Assess pain using appropriate pain scale  - Administer analgesics based on type and severity of pain and evaluate response  - Implement non-pharmacological measures as appropriate and evaluate response  - Consider cultural and social influences on pain and pain management  - Notify physician/advanced practitioner if interventions unsuccessful or patient reports new pain  Outcome: Progressing     Problem: INFECTION - ADULT  Goal: Absence or prevention of progression during hospitalization  Description: INTERVENTIONS:  - Assess and monitor for signs and symptoms of infection  - Monitor lab/diagnostic results  - Monitor all insertion sites, i e  indwelling lines, tubes, and drains  - Monitor endotracheal if appropriate and nasal secretions for changes in amount and color  - [place] appropriate cooling/warming therapies per order  - Administer medications as ordered  - Instruct and encourage patient and family to use good hand hygiene technique  - Identify and instruct in appropriate isolation precautions for identified infection/condition  Outcome: Progressing  Goal: Absence of fever/infection during neutropenic period  Description: INTERVENTIONS:  - Monitor WBC    Outcome: Progressing     Problem: SAFETY ADULT  Goal: Patient will remain free of falls  Description: INTERVENTIONS:  - Assess patient frequently for physical needs  -  Identify cognitive and physical deficits and behaviors that affect risk of falls    -  [place] fall precautions as indicated by assessment   - Educate patient/family on patient safety including physical limitations  - Instruct patient to call for assistance with activity based on assessment  - Modify environment to reduce risk of injury  - Consider OT/PT consult to assist with strengthening/mobility  Outcome: Progressing  Goal: Maintain or return to baseline ADL function  Description: INTERVENTIONS:  -  Assess patient's ability to carry out ADLs; assess patient's baseline for ADL function and identify physical deficits which impact ability to perform ADLs (bathing, care of mouth/teeth, toileting, grooming, dressing, etc )  - Assess/evaluate cause of self-care deficits   - Assess range of motion  - Assess patient's mobility; develop plan if impaired  - Assess patient's need for assistive devices and provide as appropriate  - Encourage maximum independence but intervene and supervise when necessary  - Involve family in performance of ADLs  - Assess for home care needs following discharge   - Consider OT consult to assist with ADL evaluation and planning for discharge  - Provide patient education as appropriate  Outcome: Progressing  Goal: Maintain or return mobility status to optimal level  Description: INTERVENTIONS:  - Assess patient's baseline mobility status (ambulation, transfers, stairs, etc )    - Identify cognitive and physical deficits and behaviors that affect mobility  - Identify mobility aids required to assist with transfers and/or ambulation (gait belt, sit-to-stand, lift, walker, cane, etc )  - [place] fall precautions as indicated by assessment  - Record patient progress and toleration of activity level on Mobility SBAR; progress patient to next Phase/Stage  - Instruct patient to call for assistance with activity based on assessment  - Consider rehabilitation consult to assist with strengthening/weightbearing, etc   Outcome: Progressing

## 2021-05-07 NOTE — CASE MANAGEMENT
Continue to follow  CM notified by [facility] of Pt's discharge today and will need Eliquis upon discharge  Call placed to [facility]), spoke with Chinedu Gupta, informed Chinedu Gupta of Pt's discharge today  Chinedu Gupta informed CM that she will let PT know  Call placed to Pt's [facility] pharmacy(Pat: [phone]) to check Eliquis cost, Pat informed CM that Pt has $33 copay for Eliquis  Pat informed they pharmacy has to order more Eliquis but will have Eliquis to give to Pt  CM informed [place] Number that Pt is medically stable for discharge today  Faxed Eliquis script to [place] Number at [phone]  Call placed to Pt's dtr(Nickie: [phone]), informed Pt's dtr that CM spoke with Chinedu Gupta at [facility] and Chinedu Gupta will reach out to PT to inform of Pt's discharge from hospital today  CM informed Pt's dtr that Pt will also need Eliquis upon discharge and Pt has $33 copay  CM informed Pt's dtr that 30 day free Eliquis coupon can be given to Pt to bring to pharmacy  Pt's dtr in agreement  CM also informed Pt's dtr that per Vesna Number at [employer], pharmacy will have Eliquis for Pt for this weekend but will need to order more which should come in on Monday  Pt's dtr expressed understanding

## 2021-05-07 NOTE — ASSESSMENT & PLAN NOTE
Patient diagnosed with COVID after he was noted to be hypoxic on admission, requiring 6L NC  Place on moderate covid protocol  Now successfully weaned down to 2L NC and stable  EKG - afib w/RVR  Cardiac markers proBNP 4709, , Troponin 0 11  hepatitis panel normal  IV ceftriaxone and doxycycline discontinued, procalcitonin negative x 2  on Vit D3, Vit C, Zinc, multivitamin, atorvastatin  On IV dexamethasone and IV remdesivir  Would give tocilizumab if sustained CRP > 90 with hypoxia > 12hrs  Encourage proning, incentive spirometry  Ddimer elevated at 2 46 - unable to get CT PE study given contrast allergy, already placed on eliquis for afib history   Would discharge on eliquis  Trend Inflammatory markers q48hrs;  Lab Results   Component Value Date/Time    DDIMER 2 46 (H) 05/07/2021 05:10 AM    DDIMER 3 15 (H) 05/04/2021 01:38 PM    CRP 58 4 (H) 05/06/2021 09:05 AM    CRP 70 6 (H) 05/04/2021 01:38 PM    FERRITIN 592 (H) 05/04/2021 01:38 PM

## 2021-05-12 ENCOUNTER — EPISODE CHANGES (OUTPATIENT)
Dept: CASE MANAGEMENT | Facility: HOSPITAL | Age: 81
End: 2021-05-12

## 2021-05-13 ENCOUNTER — HOSPITAL ENCOUNTER (EMERGENCY)
Facility: HOSPITAL | Age: 81
Discharge: HOME | End: 2021-05-13
Attending: EMERGENCY MEDICINE | Admitting: EMERGENCY MEDICINE
Payer: MEDICARE

## 2021-05-13 ENCOUNTER — APPOINTMENT (EMERGENCY)
Dept: RADIOLOGY | Facility: HOSPITAL | Age: 81
End: 2021-05-13
Payer: MEDICARE

## 2021-05-13 VITALS
BODY MASS INDEX: 18.52 KG/M2 | RESPIRATION RATE: 24 BRPM | WEIGHT: 118 LBS | HEIGHT: 67 IN | SYSTOLIC BLOOD PRESSURE: 138 MMHG | OXYGEN SATURATION: 95 % | TEMPERATURE: 97.9 F | DIASTOLIC BLOOD PRESSURE: 111 MMHG | HEART RATE: 85 BPM

## 2021-05-13 DIAGNOSIS — U07.1 COVID-19: ICD-10-CM

## 2021-05-13 DIAGNOSIS — J44.1 COPD WITH ACUTE EXACERBATION (HCC): Primary | ICD-10-CM

## 2021-05-13 LAB
ANION GAP SERPL CALCULATED.3IONS-SCNC: 9 MMOL/L (ref 4–13)
BASOPHILS # BLD AUTO: 0.01 THOUSANDS/ΜL (ref 0–0.1)
BASOPHILS NFR BLD AUTO: 0 % (ref 0–1)
BUN SERPL-MCNC: 20 MG/DL (ref 5–25)
CALCIUM SERPL-MCNC: 8.1 MG/DL (ref 8.3–10.1)
CHLORIDE SERPL-SCNC: 97 MMOL/L (ref 100–108)
CO2 SERPL-SCNC: 30 MMOL/L (ref 21–32)
CREAT SERPL-MCNC: 1.24 MG/DL (ref 0.6–1.3)
EOSINOPHIL # BLD AUTO: 0 THOUSAND/ΜL (ref 0–0.61)
EOSINOPHIL NFR BLD AUTO: 0 % (ref 0–6)
ERYTHROCYTE [DISTWIDTH] IN BLOOD BY AUTOMATED COUNT: 14 % (ref 11.6–15.1)
GFR SERPL CREATININE-BSD FRML MDRD: 54 ML/MIN/1.73SQ M
GLUCOSE SERPL-MCNC: 113 MG/DL (ref 65–140)
HCT VFR BLD AUTO: 41.8 % (ref 36.5–49.3)
HGB BLD-MCNC: 14 G/DL (ref 12–17)
IMM GRANULOCYTES # BLD AUTO: 0.01 THOUSAND/UL (ref 0–0.2)
IMM GRANULOCYTES NFR BLD AUTO: 0 % (ref 0–2)
LYMPHOCYTES # BLD AUTO: 0.94 THOUSANDS/ΜL (ref 0.6–4.47)
LYMPHOCYTES NFR BLD AUTO: 21 % (ref 14–44)
MCH RBC QN AUTO: 31.4 PG (ref 26.8–34.3)
MCHC RBC AUTO-ENTMCNC: 33.5 G/DL (ref 31.4–37.4)
MCV RBC AUTO: 94 FL (ref 82–98)
MONOCYTES # BLD AUTO: 0.62 THOUSAND/ΜL (ref 0.17–1.22)
MONOCYTES NFR BLD AUTO: 14 % (ref 4–12)
NEUTROPHILS # BLD AUTO: 2.99 THOUSANDS/ΜL (ref 1.85–7.62)
NEUTS SEG NFR BLD AUTO: 65 % (ref 43–75)
NRBC BLD AUTO-RTO: 0 /100 WBCS
NT-PROBNP SERPL-MCNC: 5175 PG/ML
PLATELET # BLD AUTO: 198 THOUSANDS/UL (ref 149–390)
PMV BLD AUTO: 10.2 FL (ref 8.9–12.7)
POTASSIUM SERPL-SCNC: 3.3 MMOL/L (ref 3.5–5.3)
RBC # BLD AUTO: 4.46 MILLION/UL (ref 3.88–5.62)
SODIUM SERPL-SCNC: 136 MMOL/L (ref 136–145)
TROPONIN I SERPL-MCNC: 0.05 NG/ML
WBC # BLD AUTO: 4.57 THOUSAND/UL (ref 4.31–10.16)

## 2021-05-13 PROCEDURE — 93005 ELECTROCARDIOGRAM TRACING: CPT

## 2021-05-13 PROCEDURE — 71045 X-RAY EXAM CHEST 1 VIEW: CPT

## 2021-05-13 PROCEDURE — 85025 COMPLETE CBC W/AUTO DIFF WBC: CPT | Performed by: EMERGENCY MEDICINE

## 2021-05-13 PROCEDURE — 99285 EMERGENCY DEPT VISIT HI MDM: CPT

## 2021-05-13 PROCEDURE — 84484 ASSAY OF TROPONIN QUANT: CPT | Performed by: EMERGENCY MEDICINE

## 2021-05-13 PROCEDURE — 80048 BASIC METABOLIC PNL TOTAL CA: CPT | Performed by: EMERGENCY MEDICINE

## 2021-05-13 PROCEDURE — 83880 ASSAY OF NATRIURETIC PEPTIDE: CPT | Performed by: EMERGENCY MEDICINE

## 2021-05-13 PROCEDURE — 99285 EMERGENCY DEPT VISIT HI MDM: CPT | Performed by: EMERGENCY MEDICINE

## 2021-05-13 PROCEDURE — 96375 TX/PRO/DX INJ NEW DRUG ADDON: CPT

## 2021-05-13 PROCEDURE — 36415 COLL VENOUS BLD VENIPUNCTURE: CPT | Performed by: EMERGENCY MEDICINE

## 2021-05-13 PROCEDURE — 96374 THER/PROPH/DIAG INJ IV PUSH: CPT

## 2021-05-13 RX ORDER — PREDNISONE 20 MG/1
60 TABLET ORAL DAILY
Qty: 12 TABLET | Refills: 0 | Status: SHIPPED | OUTPATIENT
Start: 2021-05-14 | End: 2021-05-18 | Stop reason: HOSPADM

## 2021-05-13 RX ORDER — AZITHROMYCIN 250 MG/1
250 TABLET, FILM COATED ORAL EVERY 24 HOURS
Qty: 4 TABLET | Refills: 0 | Status: SHIPPED | OUTPATIENT
Start: 2021-05-14 | End: 2021-05-18 | Stop reason: HOSPADM

## 2021-05-13 RX ORDER — METHYLPREDNISOLONE SODIUM SUCCINATE 125 MG/2ML
80 INJECTION, POWDER, LYOPHILIZED, FOR SOLUTION INTRAMUSCULAR; INTRAVENOUS ONCE
Status: COMPLETED | OUTPATIENT
Start: 2021-05-13 | End: 2021-05-13

## 2021-05-13 RX ADMIN — AZITHROMYCIN MONOHYDRATE 500 MG: 500 INJECTION, POWDER, LYOPHILIZED, FOR SOLUTION INTRAVENOUS at 17:45

## 2021-05-13 RX ADMIN — METHYLPREDNISOLONE SODIUM SUCCINATE 80 MG: 125 INJECTION, POWDER, FOR SOLUTION INTRAMUSCULAR; INTRAVENOUS at 16:45

## 2021-05-13 NOTE — ED PROVIDER NOTES
History  Chief Complaint   Patient presents with    Shortness of Breath     Pt [facility] with dyspnea  Given neb en route  Arrives without wheeze  79-year-old male presents via EMS from home  The patient has a history of atrial fibrillation, COPD, and recent diagnosis COVID  The patient had worsening shortness of breath and cough today  Upon EMS arrival, they found the patient wheezing increased work of breathing and respiratory distress  The patient was given a DuoNeb in route with improvement in oxygen levels from 86% to 95%  The patient also had decreased work breathing and decreased respiratory rate  The patient's wheezing also resolved  The patient symptomatically feels much improved upon his presentation to the emergency department  The patient denies any associated fevers or chills  He denies any chest pain or pressure  Prior to Admission Medications   Prescriptions Last Dose Informant Patient Reported?  Taking?   acetaminophen (TYLENOL) 500 mg tablet   Yes No   Sig: Take 1,000 mg by mouth every 6 (six) hours as needed   albuterol (PROVENTIL HFA,VENTOLIN HFA) 90 mcg/act inhaler   Yes No   apixaban (ELIQUIS) 2 5 mg   No No   Sig: Take 1 tablet (2 5 mg total) by mouth 2 (two) times a day   aspirin 81 mg chewable tablet   Yes No   Sig: Chew 81 mg daily   chlorthalidone 25 mg tablet   Yes No   Sig: Take 25 mg by mouth daily   erythromycin (ILOTYCIN) ophthalmic ointment   No No   Sig: Administer 0 5 inches to both eyes every 8 (eight) hours   fluticasone-umeclidinium-vilanterol (Trelegy Ellipta) 100-62 5-25 MCG/INH inhaler   Yes No   metoprolol tartrate (LOPRESSOR) 25 mg tablet   No No   Sig: Take 0 5 tablets (12 5 mg total) by mouth every 12 (twelve) hours   mirtazapine (REMERON) 30 mg tablet   Yes No   Sig: Take 30 mg by mouth daily at bedtime   ondansetron (ZOFRAN-ODT) 4 mg disintegrating tablet   No No   Sig: Take 1 tablet (4 mg total) by mouth every 6 (six) hours as needed for nausea or vomiting   oxyCODONE (OXY-IR) 5 MG capsule   Yes No   Sig: Take 5 mg by mouth every 8 (eight) hours   oxyCODONE (OXY-IR) 5 MG capsule   Yes No   Sig: Take 5 mg by mouth   predniSONE 10 mg tablet   Yes No   Sig: Take by mouth daily   risperiDONE (RisperDAL) 0 5 mg tablet   Yes No      Facility-Administered Medications: None       Past Medical History:   Diagnosis Date    [facility]     COVID-19     Emphysema lung ([facility] )     Kidney failure     MI, old        Past Surgical History:   Procedure Laterality Date    BACK SURGERY      BELOW KNEE LEG AMPUTATION      CARDIAC SURGERY      HERNIA REPAIR         History reviewed  No pertinent family history  I have reviewed and agree with the history as documented  E-Cigarette/Vaping    E-Cigarette Use Never User      E-Cigarette/Vaping Substances    Nicotine No     Flavoring No      Social History     Tobacco Use    Smoking status: Current Every Day Smoker     Packs/day: 1 00     Types: Cigarettes    Smokeless tobacco: Never Used   Substance Use Topics    Alcohol use: Yes     Frequency: Monthly or less     Comment: socially     Drug use: Not Currently       Review of Systems   Constitutional: Positive for fatigue  Respiratory: Positive for cough, chest tightness, shortness of breath and wheezing  Cardiovascular: Negative for chest pain  All other systems reviewed and are negative  Physical Exam  Physical Exam  Vitals signs and nursing note reviewed  Constitutional:       Appearance: He is well-developed  HENT:      Head: Normocephalic and atraumatic  Right Ear: External ear normal       Left Ear: External ear normal       Mouth/Throat:      Pharynx: No oropharyngeal exudate  Eyes:      General: No scleral icterus  Pupils: Pupils are equal, round, and reactive to light  Neck:      Musculoskeletal: Normal range of motion and neck supple     Cardiovascular:      Rate and Rhythm: Tachycardia present  Rhythm irregular  Heart sounds: Normal heart sounds  Pulmonary:      Effort: Pulmonary effort is normal  Tachypnea present  No respiratory distress  Breath sounds: Examination of the right-lower field reveals rales  Examination of the left-lower field reveals rales  Decreased breath sounds and rales present  No wheezing  Abdominal:      General: Bowel sounds are normal       Palpations: Abdomen is soft  Tenderness: There is no abdominal tenderness  There is no guarding or rebound  Musculoskeletal: Normal range of motion  Skin:     General: Skin is warm and dry  Findings: No rash  Neurological:      Mental Status: He is alert and oriented to person, place, and time           Vital Signs  ED Triage Vitals [05/13/21 1613]   Temperature Pulse Respirations Blood Pressure SpO2   97 9 °F (36 6 °C) 66 20 125/70 93 %      Temp Source Heart Rate Source Patient Position - Orthostatic VS BP Location FiO2 (%)   Temporal Monitor Sitting Right arm --      Pain Score       --           Vitals:    05/13/21 1700 05/13/21 1710 05/13/21 1730 05/13/21 1745   BP:  136/77 134/76 (!) 138/111   Pulse: 85 91 83 85   Patient Position - Orthostatic VS:  Sitting  Sitting         Visual Acuity      ED Medications  Medications   methylPREDNISolone sodium succinate (Solu-MEDROL) injection 80 mg (80 mg Intravenous Given 5/13/21 1645)   azithromycin (ZITHROMAX) 500 mg in sodium chloride 0 9% 250mL IVPB 500 mg (0 mg Intravenous Stopped 5/13/21 1845)       Diagnostic Studies  Results Reviewed     Procedure Component Value Units Date/Time    NT-BNP PRO [[id number]]  (Abnormal) Collected: 05/13/21 1619    Lab Status: Final result Specimen: Blood from Arm, Left Updated: 05/13/21 1700     NT-proBNP 5,175 pg/mL     Troponin I [[id number]]  (Abnormal) Collected: 05/13/21 1619    Lab Status: Final result Specimen: Blood from Arm, Left Updated: 05/13/21 1646     Troponin I 0 05 ng/mL     Basic metabolic panel [[id number]] (Abnormal) Collected: 05/13/21 1619    Lab Status: Final result Specimen: Blood from Arm, Left Updated: 05/13/21 1645     Sodium 136 mmol/L      Potassium 3 3 mmol/L      Chloride 97 mmol/L      CO2 30 mmol/L      ANION GAP 9 mmol/L      BUN 20 mg/dL      Creatinine 1 24 mg/dL      Glucose 113 mg/dL      Calcium 8 1 mg/dL      eGFR 54 ml/min/1 73sq m     Narrative:      [place] guidelines for Chronic Kidney Disease (CKD):     Stage 1 with normal or high GFR (GFR > 90 mL/min/1 73 square meters)    Stage 2 Mild CKD (GFR = 60-89 mL/min/1 73 square meters)    Stage 3A Moderate CKD (GFR = 45-59 mL/min/1 73 square meters)    Stage 3B Moderate CKD (GFR = 30-44 mL/min/1 73 square meters)    Stage 4 Severe CKD (GFR = 15-29 mL/min/1 73 square meters)    Stage 5 End Stage CKD (GFR <15 mL/min/1 73 square meters)  Note: GFR calculation is accurate only with a steady state creatinine    CBC and differential [[id number]]  (Abnormal) Collected: 05/13/21 1619    Lab Status: Final result Specimen: Blood from Arm, Left Updated: 05/13/21 1624     WBC 4 57 Thousand/uL      RBC 4 46 Million/uL      Hemoglobin 14 0 g/dL      Hematocrit 41 8 %      MCV 94 fL      MCH 31 4 pg      MCHC 33 5 g/dL      RDW 14 0 %      MPV 10 2 fL      Platelets 244 Thousands/uL      nRBC 0 /100 WBCs      Neutrophils Relative 65 %      Immat GRANS % 0 %      Lymphocytes Relative 21 %      Monocytes Relative 14 %      Eosinophils Relative 0 %      Basophils Relative 0 %      Neutrophils Absolute 2 99 Thousands/µL      Immature Grans Absolute 0 01 Thousand/uL      Lymphocytes Absolute 0 94 Thousands/µL      Monocytes Absolute 0 62 Thousand/µL      Eosinophils Absolute 0 00 Thousand/µL      Basophils Absolute 0 01 Thousands/µL                  XR chest 1 view portable   ED Interpretation by Shelli Sharp DO (05/13 1634)   No acute cardiopulmonary process, hyperinflation      Final Result by Tawana Garcia MD (05/13 1648)      No acute cardiopulmonary disease  Severe emphysema with vascular crowding in the lung bases      Redemonstration of ascending aortic aneurysm  Workstation performed: [id number]                    Procedures  ECG 12 Lead Documentation Only    Date/Time: 5/13/2021 4:34 PM  Performed by: Rj Rivera DO  Authorized by: Rj Rivera DO     Indications / Diagnosis:  Shortness of breath  ECG reviewed by me, the ED Provider: yes    Patient location:  ED  Previous ECG:     Previous ECG:  Compared to current    Comparison ECG info:  Atrial fibrillation has replaced sinus rhythm from 5/4/2021  There is new PVC  There are no acute ischemic changes    Similarity:  Changes noted  Interpretation:     Interpretation: abnormal    Rate:     ECG rate:  98    ECG rate assessment: normal    Rhythm:     Rhythm: atrial fibrillation    Ectopy:     Ectopy: PVCs    ST segments:     ST segments:  Normal  T waves:     T waves: normal               ED Course  ED Course as of May 13 2242   Thu May 13, 2021   1749 Patient referred for re-evaluation  The patient's respiratory rate is no longer tachypneic  The patient feels improved and states he feels back to his baseline  I note the patient still has an elevated troponin of 0 05 however this is decreased from his previous and continues to trend down  SBIRT 22yo+      Most Recent Value   SBIRT (24 yo +)   In order to provide better care to our patients, we are screening all of our patients for alcohol and drug use  Would it be okay to ask you these screening questions? Yes Filed at: 05/13/2021 1615   Initial Alcohol Screen: US AUDIT-C    1  How often do you have a drink containing alcohol?  0 Filed at: 05/13/2021 1615   2  How many drinks containing alcohol do you have on a typical day you are drinking? 0 Filed at: 05/13/2021 1615   3a  Male UNDER 65: How often do you have five or more drinks on one occasion?   0 Filed at: 05/13/2021 1615 3b  FEMALE Any Age, or MALE 65+: How often do you have 4 or more drinks on one occassion? 0 Filed at: 05/13/2021 1615   Audit-C Score  0 Filed at: 05/13/2021 1615   YAW: How many times in the past year have you    Used an illegal drug or used a prescription medication for non-medical reasons? Never Filed at: 05/13/2021 1615                    [facility]  Number of Diagnoses or Management Options  COPD with acute exacerbation [facility]):   COVID-19:   Diagnosis management comments: 69-year-old male presents for evaluation of shortness of breath with wheezing after subacute diagnosis of COVID  The patient has a history of COPD  In route, the patient was treated for COPD exacerbation by EMS with remarkable improvement  EKG, chest x-ray and laboratories were ordered  They were reviewed the patient was observed in the emergency department  The patient continued to clinically improve  He was given IV Solu-Medrol with plan for discharge with oral steroids as well as Zithromax for COPD exacerbation  The patient was stable on his baseline oxygen requirement without increased work of breathing upon re-evaluation  The patient felt stable and at his baseline and was amenable to the plan of discharge in outpatient follow-up with his pulmonary doctor  Strict return precautions were discussed with patient verbalized understanding           Amount and/or Complexity of Data Reviewed  Clinical lab tests: reviewed  Tests in the radiology section of CPT®: reviewed  Decide to obtain previous medical records or to obtain history from someone other than the patient: yes  Review and summarize past medical records: yes (Recent hospitalization notes reviewed )  Independent visualization of images, tracings, or specimens: yes        Disposition  Final diagnoses:   COPD with acute exacerbation ([facility] )   COVID-19     Time reflects when diagnosis was documented in both MDM as applicable and the Disposition within this note     Time User Action Codes Description Comment    5/13/2021  5:50 PM Anna Karel Add [J44 1] COPD with acute exacerbation ([facility] )     5/13/2021  5:50 PM Anna Karel Add [U07 1] COVID-19       ED Disposition     ED Disposition Condition Date/Time Comment    Discharge Stable Thu May 13, 2021  5:50 PM Parker Hewitt discharge to home/self care  Follow-up Information     Follow up With Specialties Details Why Contact Info    your lung doctor (Dr Ivana Duke)  Call in 1 day For further evaluation           Discharge Medication List as of 5/13/2021  5:54 PM      START taking these medications    Details   azithromycin (ZITHROMAX) 250 mg tablet Take 1 tablet (250 mg total) by mouth every 24 hours for 4 days Take 2 tablets today then 1 tablet daily x 4 days, Starting Fri 5/14/2021, Until Tue 5/18/2021, Normal      !! predniSONE 20 mg tablet Take 3 tablets (60 mg total) by mouth daily for 4 days, Starting Fri 5/14/2021, Until Tue 5/18/2021, Normal       !! - Potential duplicate medications found  Please discuss with provider        CONTINUE these medications which have NOT CHANGED    Details   acetaminophen (TYLENOL) 500 mg tablet Take 1,000 mg by mouth every 6 (six) hours as needed, Historical Med      albuterol (PROVENTIL HFA,VENTOLIN HFA) 90 mcg/act inhaler Starting Mon 3/29/2021, Historical Med      apixaban (ELIQUIS) 2 5 mg Take 1 tablet (2 5 mg total) by mouth 2 (two) times a day, Starting Fri 5/7/2021, Until Sun 6/6/2021, Print      aspirin 81 mg chewable tablet Chew 81 mg daily, Historical Med      chlorthalidone 25 mg tablet Take 25 mg by mouth daily, Historical Med      erythromycin (ILOTYCIN) ophthalmic ointment Administer 0 5 inches to both eyes every 8 (eight) hours, Starting Tue 1/12/2021, Normal      fluticasone-umeclidinium-vilanterol (Trelegy Ellipta) 100-62 5-25 MCG/INH inhaler Starting Fri 12/4/2020, Historical Med      metoprolol tartrate (LOPRESSOR) 25 mg tablet Take 0 5 tablets (12 5 mg total) by mouth every 12 (twelve) hours, Starting Fri 5/7/2021, Until Sun 6/6/2021, Normal      mirtazapine (REMERON) 30 mg tablet Take 30 mg by mouth daily at bedtime, Historical Med      ondansetron (ZOFRAN-ODT) 4 mg disintegrating tablet Take 1 tablet (4 mg total) by mouth every 6 (six) hours as needed for nausea or vomiting, Starting Tue 1/12/2021, Normal      !! oxyCODONE (OXY-IR) 5 MG capsule Take 5 mg by mouth every 8 (eight) hours, Historical Med      !! oxyCODONE (OXY-IR) 5 MG capsule Take 5 mg by mouth, Historical Med      !! predniSONE 10 mg tablet Take by mouth daily, Historical Med      risperiDONE (RisperDAL) 0 5 mg tablet Starting Wed 4/7/2021, Historical Med       !! - Potential duplicate medications found  Please discuss with provider  No discharge procedures on file      PDMP Review     None          ED Provider  Electronically Signed by           Dre Patrick DO  05/13/21 5344

## 2021-05-14 LAB
ATRIAL RATE: 141 BPM
QRS AXIS: 65 DEGREES
QRSD INTERVAL: 80 MS
QT INTERVAL: 372 MS
QTC INTERVAL: 474 MS
T WAVE AXIS: 53 DEGREES
VENTRICULAR RATE: 98 BPM

## 2021-05-14 PROCEDURE — 93010 ELECTROCARDIOGRAM REPORT: CPT | Performed by: INTERNAL MEDICINE

## 2021-05-17 ENCOUNTER — APPOINTMENT (EMERGENCY)
Dept: RADIOLOGY | Facility: HOSPITAL | Age: 81
DRG: 177 | End: 2021-05-17
Payer: MEDICARE

## 2021-05-17 ENCOUNTER — HOSPITAL ENCOUNTER (INPATIENT)
Facility: HOSPITAL | Age: 81
LOS: 1 days | Discharge: HOME HEALTH | DRG: 177 | End: 2021-05-18
Attending: EMERGENCY MEDICINE | Admitting: INTERNAL MEDICINE
Payer: MEDICARE

## 2021-05-17 DIAGNOSIS — E87.2 LACTIC ACIDOSIS: ICD-10-CM

## 2021-05-17 DIAGNOSIS — R77.8 ELEVATED TROPONIN: ICD-10-CM

## 2021-05-17 DIAGNOSIS — E87.6 HYPOKALEMIA: ICD-10-CM

## 2021-05-17 DIAGNOSIS — J96.01 ACUTE RESPIRATORY FAILURE WITH HYPOXIA (HCC): ICD-10-CM

## 2021-05-17 DIAGNOSIS — U07.1 COVID-19: Primary | ICD-10-CM

## 2021-05-17 DIAGNOSIS — R09.02 HYPOXEMIA: ICD-10-CM

## 2021-05-17 DIAGNOSIS — J18.9 PNEUMONIA: ICD-10-CM

## 2021-05-17 PROBLEM — R79.89 ELEVATED LACTIC ACID LEVEL: Status: ACTIVE | Noted: 2021-05-17

## 2021-05-17 PROBLEM — J12.82 PNEUMONIA DUE TO COVID-19 VIRUS: Status: ACTIVE | Noted: 2021-05-17

## 2021-05-17 LAB
ALBUMIN SERPL BCP-MCNC: 3.1 G/DL (ref 3.5–5)
ALP SERPL-CCNC: 120 U/L (ref 46–116)
ALT SERPL W P-5'-P-CCNC: 22 U/L (ref 12–78)
ANION GAP SERPL CALCULATED.3IONS-SCNC: 12 MMOL/L (ref 4–13)
APTT PPP: 29 SECONDS (ref 23–37)
AST SERPL W P-5'-P-CCNC: 41 U/L (ref 5–45)
ATRIAL RATE: 86 BPM
BASE EXCESS BLDA CALC-SCNC: 5 MMOL/L (ref -2–3)
BASOPHILS # BLD AUTO: 0.02 THOUSANDS/ΜL (ref 0–0.1)
BASOPHILS NFR BLD AUTO: 0 % (ref 0–1)
BILIRUB SERPL-MCNC: 1.1 MG/DL (ref 0.2–1)
BUN SERPL-MCNC: 31 MG/DL (ref 5–25)
CA-I BLD-SCNC: 1.05 MMOL/L (ref 1.12–1.32)
CALCIUM ALBUM COR SERPL-MCNC: 9.3 MG/DL (ref 8.3–10.1)
CALCIUM SERPL-MCNC: 8.6 MG/DL (ref 8.3–10.1)
CHLORIDE SERPL-SCNC: 99 MMOL/L (ref 100–108)
CK SERPL-CCNC: 35 U/L (ref 39–308)
CO2 SERPL-SCNC: 31 MMOL/L (ref 21–32)
CREAT SERPL-MCNC: 1.26 MG/DL (ref 0.6–1.3)
CRP SERPL QL: 100.3 MG/L
D DIMER PPP FEU-MCNC: 3.4 UG/ML FEU
EOSINOPHIL # BLD AUTO: 0 THOUSAND/ΜL (ref 0–0.61)
EOSINOPHIL NFR BLD AUTO: 0 % (ref 0–6)
ERYTHROCYTE [DISTWIDTH] IN BLOOD BY AUTOMATED COUNT: 13.8 % (ref 11.6–15.1)
FERRITIN SERPL-MCNC: 947 NG/ML (ref 8–388)
GFR SERPL CREATININE-BSD FRML MDRD: 53 ML/MIN/1.73SQ M
GLUCOSE SERPL-MCNC: 84 MG/DL (ref 65–140)
GLUCOSE SERPL-MCNC: 92 MG/DL (ref 65–140)
HCO3 BLDA-SCNC: 29.9 MMOL/L (ref 24–30)
HCT VFR BLD AUTO: 44.9 % (ref 36.5–49.3)
HCT VFR BLD CALC: 39 % (ref 36.5–49.3)
HGB BLD-MCNC: 15.2 G/DL (ref 12–17)
HGB BLDA-MCNC: 13.3 G/DL (ref 12–17)
IMM GRANULOCYTES # BLD AUTO: 0.03 THOUSAND/UL (ref 0–0.2)
IMM GRANULOCYTES NFR BLD AUTO: 1 % (ref 0–2)
INR PPP: 0.91 (ref 0.84–1.19)
LACTATE SERPL-SCNC: 2.1 MMOL/L (ref 0.5–2)
LACTATE SERPL-SCNC: 2.5 MMOL/L (ref 0.5–2)
LACTATE SERPL-SCNC: 2.7 MMOL/L (ref 0.5–2)
LYMPHOCYTES # BLD AUTO: 0.95 THOUSANDS/ΜL (ref 0.6–4.47)
LYMPHOCYTES NFR BLD AUTO: 17 % (ref 14–44)
MCH RBC QN AUTO: 31.7 PG (ref 26.8–34.3)
MCHC RBC AUTO-ENTMCNC: 33.9 G/DL (ref 31.4–37.4)
MCV RBC AUTO: 94 FL (ref 82–98)
MONOCYTES # BLD AUTO: 0.2 THOUSAND/ΜL (ref 0.17–1.22)
MONOCYTES NFR BLD AUTO: 4 % (ref 4–12)
NEUTROPHILS # BLD AUTO: 4.55 THOUSANDS/ΜL (ref 1.85–7.62)
NEUTS SEG NFR BLD AUTO: 78 % (ref 43–75)
NRBC BLD AUTO-RTO: 0 /100 WBCS
NT-PROBNP SERPL-MCNC: 4342 PG/ML
P AXIS: 56 DEGREES
PCO2 BLD: 31 MMOL/L (ref 21–32)
PCO2 BLD: 44.5 MM HG (ref 42–50)
PH BLD: 7.43 [PH] (ref 7.3–7.4)
PLATELET # BLD AUTO: 220 THOUSANDS/UL (ref 149–390)
PMV BLD AUTO: 10.4 FL (ref 8.9–12.7)
PO2 BLD: 22 MM HG (ref 35–45)
POTASSIUM BLD-SCNC: 2.7 MMOL/L (ref 3.5–5.3)
POTASSIUM SERPL-SCNC: 2.7 MMOL/L (ref 3.5–5.3)
POTASSIUM SERPL-SCNC: 4.5 MMOL/L (ref 3.5–5.3)
PR INTERVAL: 132 MS
PROCALCITONIN SERPL-MCNC: <0.05 NG/ML
PROT SERPL-MCNC: 7.6 G/DL (ref 6.4–8.2)
PROTHROMBIN TIME: 12.2 SECONDS (ref 11.6–14.5)
QRS AXIS: 61 DEGREES
QRSD INTERVAL: 84 MS
QT INTERVAL: 410 MS
QTC INTERVAL: 490 MS
RBC # BLD AUTO: 4.8 MILLION/UL (ref 3.88–5.62)
SAO2 % BLD FROM PO2: 39 % (ref 60–85)
SODIUM BLD-SCNC: 138 MMOL/L (ref 136–145)
SODIUM SERPL-SCNC: 142 MMOL/L (ref 136–145)
SPECIMEN SOURCE: ABNORMAL
T WAVE AXIS: 21 DEGREES
TROPONIN I SERPL-MCNC: 0.06 NG/ML
VENTRICULAR RATE: 86 BPM
WBC # BLD AUTO: 5.75 THOUSAND/UL (ref 4.31–10.16)

## 2021-05-17 PROCEDURE — 82330 ASSAY OF CALCIUM: CPT

## 2021-05-17 PROCEDURE — 85379 FIBRIN DEGRADATION QUANT: CPT | Performed by: INTERNAL MEDICINE

## 2021-05-17 PROCEDURE — 93005 ELECTROCARDIOGRAM TRACING: CPT

## 2021-05-17 PROCEDURE — 96365 THER/PROPH/DIAG IV INF INIT: CPT

## 2021-05-17 PROCEDURE — 96375 TX/PRO/DX INJ NEW DRUG ADDON: CPT

## 2021-05-17 PROCEDURE — 99223 1ST HOSP IP/OBS HIGH 75: CPT | Performed by: INTERNAL MEDICINE

## 2021-05-17 PROCEDURE — 84295 ASSAY OF SERUM SODIUM: CPT

## 2021-05-17 PROCEDURE — 71045 X-RAY EXAM CHEST 1 VIEW: CPT

## 2021-05-17 PROCEDURE — 85014 HEMATOCRIT: CPT

## 2021-05-17 PROCEDURE — 36415 COLL VENOUS BLD VENIPUNCTURE: CPT | Performed by: EMERGENCY MEDICINE

## 2021-05-17 PROCEDURE — 93010 ELECTROCARDIOGRAM REPORT: CPT | Performed by: INTERNAL MEDICINE

## 2021-05-17 PROCEDURE — 83605 ASSAY OF LACTIC ACID: CPT | Performed by: INTERNAL MEDICINE

## 2021-05-17 PROCEDURE — 84484 ASSAY OF TROPONIN QUANT: CPT | Performed by: EMERGENCY MEDICINE

## 2021-05-17 PROCEDURE — 82728 ASSAY OF FERRITIN: CPT | Performed by: EMERGENCY MEDICINE

## 2021-05-17 PROCEDURE — 80053 COMPREHEN METABOLIC PANEL: CPT | Performed by: EMERGENCY MEDICINE

## 2021-05-17 PROCEDURE — 82803 BLOOD GASES ANY COMBINATION: CPT

## 2021-05-17 PROCEDURE — 86140 C-REACTIVE PROTEIN: CPT | Performed by: EMERGENCY MEDICINE

## 2021-05-17 PROCEDURE — 99285 EMERGENCY DEPT VISIT HI MDM: CPT

## 2021-05-17 PROCEDURE — 84132 ASSAY OF SERUM POTASSIUM: CPT | Performed by: INTERNAL MEDICINE

## 2021-05-17 PROCEDURE — 83880 ASSAY OF NATRIURETIC PEPTIDE: CPT | Performed by: EMERGENCY MEDICINE

## 2021-05-17 PROCEDURE — 85610 PROTHROMBIN TIME: CPT | Performed by: EMERGENCY MEDICINE

## 2021-05-17 PROCEDURE — 87040 BLOOD CULTURE FOR BACTERIA: CPT | Performed by: EMERGENCY MEDICINE

## 2021-05-17 PROCEDURE — 82550 ASSAY OF CK (CPK): CPT | Performed by: INTERNAL MEDICINE

## 2021-05-17 PROCEDURE — 83605 ASSAY OF LACTIC ACID: CPT | Performed by: EMERGENCY MEDICINE

## 2021-05-17 PROCEDURE — 84132 ASSAY OF SERUM POTASSIUM: CPT

## 2021-05-17 PROCEDURE — 84145 PROCALCITONIN (PCT): CPT | Performed by: EMERGENCY MEDICINE

## 2021-05-17 PROCEDURE — 85730 THROMBOPLASTIN TIME PARTIAL: CPT | Performed by: EMERGENCY MEDICINE

## 2021-05-17 PROCEDURE — 85025 COMPLETE CBC W/AUTO DIFF WBC: CPT | Performed by: EMERGENCY MEDICINE

## 2021-05-17 PROCEDURE — 82947 ASSAY GLUCOSE BLOOD QUANT: CPT

## 2021-05-17 PROCEDURE — 99285 EMERGENCY DEPT VISIT HI MDM: CPT | Performed by: EMERGENCY MEDICINE

## 2021-05-17 RX ORDER — CEFTRIAXONE 1 G/50ML
1000 INJECTION, SOLUTION INTRAVENOUS EVERY 24 HOURS
Status: DISCONTINUED | OUTPATIENT
Start: 2021-05-18 | End: 2021-05-18 | Stop reason: HOSPADM

## 2021-05-17 RX ORDER — CEFTRIAXONE 2 G/50ML
2000 INJECTION, SOLUTION INTRAVENOUS ONCE
Status: COMPLETED | OUTPATIENT
Start: 2021-05-17 | End: 2021-05-17

## 2021-05-17 RX ORDER — ASPIRIN 81 MG/1
81 TABLET, CHEWABLE ORAL DAILY
Status: DISCONTINUED | OUTPATIENT
Start: 2021-05-18 | End: 2021-05-18 | Stop reason: HOSPADM

## 2021-05-17 RX ORDER — POTASSIUM CHLORIDE 14.9 MG/ML
20 INJECTION INTRAVENOUS ONCE
Status: COMPLETED | OUTPATIENT
Start: 2021-05-17 | End: 2021-05-17

## 2021-05-17 RX ORDER — RISPERIDONE 1 MG/1
0.5 TABLET, FILM COATED ORAL
Status: DISCONTINUED | OUTPATIENT
Start: 2021-05-17 | End: 2021-05-18 | Stop reason: HOSPADM

## 2021-05-17 RX ORDER — ACETAMINOPHEN 325 MG/1
650 TABLET ORAL EVERY 6 HOURS PRN
Status: DISCONTINUED | OUTPATIENT
Start: 2021-05-17 | End: 2021-05-18 | Stop reason: HOSPADM

## 2021-05-17 RX ORDER — DOXYCYCLINE HYCLATE 100 MG/1
100 CAPSULE ORAL ONCE
Status: COMPLETED | OUTPATIENT
Start: 2021-05-17 | End: 2021-05-17

## 2021-05-17 RX ORDER — DEXAMETHASONE SODIUM PHOSPHATE 10 MG/ML
6 INJECTION, SOLUTION INTRAMUSCULAR; INTRAVENOUS ONCE
Status: COMPLETED | OUTPATIENT
Start: 2021-05-17 | End: 2021-05-17

## 2021-05-17 RX ORDER — FLUTICASONE FUROATE AND VILANTEROL 100; 25 UG/1; UG/1
1 POWDER RESPIRATORY (INHALATION) DAILY
Status: DISCONTINUED | OUTPATIENT
Start: 2021-05-18 | End: 2021-05-18 | Stop reason: HOSPADM

## 2021-05-17 RX ORDER — NICOTINE 21 MG/24HR
1 PATCH, TRANSDERMAL 24 HOURS TRANSDERMAL DAILY
Status: DISCONTINUED | OUTPATIENT
Start: 2021-05-18 | End: 2021-05-18 | Stop reason: HOSPADM

## 2021-05-17 RX ORDER — MELATONIN
2000 DAILY
Status: DISCONTINUED | OUTPATIENT
Start: 2021-05-18 | End: 2021-05-18 | Stop reason: HOSPADM

## 2021-05-17 RX ORDER — ERYTHROMYCIN 5 MG/G
0.5 OINTMENT OPHTHALMIC EVERY 8 HOURS SCHEDULED
Status: DISCONTINUED | OUTPATIENT
Start: 2021-05-17 | End: 2021-05-18 | Stop reason: HOSPADM

## 2021-05-17 RX ORDER — ONDANSETRON 2 MG/ML
4 INJECTION INTRAMUSCULAR; INTRAVENOUS EVERY 6 HOURS PRN
Status: DISCONTINUED | OUTPATIENT
Start: 2021-05-17 | End: 2021-05-18 | Stop reason: HOSPADM

## 2021-05-17 RX ORDER — ASCORBIC ACID 500 MG
1000 TABLET ORAL EVERY 12 HOURS SCHEDULED
Status: DISCONTINUED | OUTPATIENT
Start: 2021-05-18 | End: 2021-05-18 | Stop reason: HOSPADM

## 2021-05-17 RX ORDER — ZINC SULFATE 50(220)MG
220 CAPSULE ORAL DAILY
Status: DISCONTINUED | OUTPATIENT
Start: 2021-05-18 | End: 2021-05-18 | Stop reason: HOSPADM

## 2021-05-17 RX ORDER — MIRTAZAPINE 15 MG/1
30 TABLET, FILM COATED ORAL
Status: DISCONTINUED | OUTPATIENT
Start: 2021-05-17 | End: 2021-05-18 | Stop reason: HOSPADM

## 2021-05-17 RX ORDER — MULTIVITAMIN/IRON/FOLIC ACID 18MG-0.4MG
1 TABLET ORAL DAILY
Status: DISCONTINUED | OUTPATIENT
Start: 2021-05-25 | End: 2021-05-18 | Stop reason: HOSPADM

## 2021-05-17 RX ORDER — DOXYCYCLINE HYCLATE 100 MG/1
100 CAPSULE ORAL EVERY 12 HOURS SCHEDULED
Status: DISCONTINUED | OUTPATIENT
Start: 2021-05-18 | End: 2021-05-18 | Stop reason: HOSPADM

## 2021-05-17 RX ORDER — FAMOTIDINE 20 MG/1
20 TABLET, FILM COATED ORAL DAILY
Status: DISCONTINUED | OUTPATIENT
Start: 2021-05-18 | End: 2021-05-18 | Stop reason: HOSPADM

## 2021-05-17 RX ORDER — ALBUTEROL SULFATE 90 UG/1
1 AEROSOL, METERED RESPIRATORY (INHALATION) EVERY 4 HOURS PRN
Status: DISCONTINUED | OUTPATIENT
Start: 2021-05-17 | End: 2021-05-18 | Stop reason: HOSPADM

## 2021-05-17 RX ORDER — CHLORTHALIDONE 25 MG/1
25 TABLET ORAL DAILY
Status: DISCONTINUED | OUTPATIENT
Start: 2021-05-18 | End: 2021-05-18 | Stop reason: HOSPADM

## 2021-05-17 RX ORDER — DEXAMETHASONE SODIUM PHOSPHATE 4 MG/ML
6 INJECTION, SOLUTION INTRA-ARTICULAR; INTRALESIONAL; INTRAMUSCULAR; INTRAVENOUS; SOFT TISSUE EVERY 24 HOURS
Status: DISCONTINUED | OUTPATIENT
Start: 2021-05-18 | End: 2021-05-18 | Stop reason: HOSPADM

## 2021-05-17 RX ADMIN — DEXAMETHASONE SODIUM PHOSPHATE 6 MG: 10 INJECTION, SOLUTION INTRAMUSCULAR; INTRAVENOUS at 17:58

## 2021-05-17 RX ADMIN — SODIUM CHLORIDE 500 ML: 0.9 INJECTION, SOLUTION INTRAVENOUS at 20:51

## 2021-05-17 RX ADMIN — RISPERIDONE 0.5 MG: 0.5 TABLET ORAL at 23:08

## 2021-05-17 RX ADMIN — POTASSIUM CHLORIDE 20 MEQ: 14.9 INJECTION, SOLUTION INTRAVENOUS at 18:25

## 2021-05-17 RX ADMIN — METOPROLOL TARTRATE 12.5 MG: 25 TABLET, FILM COATED ORAL at 22:53

## 2021-05-17 RX ADMIN — CEFTRIAXONE 2000 MG: 2 INJECTION, SOLUTION INTRAVENOUS at 17:58

## 2021-05-17 RX ADMIN — ERYTHROMYCIN 0.5 INCH: 5 OINTMENT OPHTHALMIC at 22:53

## 2021-05-17 RX ADMIN — DOXYCYCLINE 100 MG: 100 CAPSULE ORAL at 18:27

## 2021-05-17 RX ADMIN — MIRTAZAPINE 30 MG: 15 TABLET, FILM COATED ORAL at 23:08

## 2021-05-17 NOTE — ED PROVIDER NOTES
History  Chief Complaint   Patient presents with    Weakness - Generalized     EMS states that patient was diagnosed with covid 19 a week ago and is feeling increasingly weak  This is an 50-year-old male who presents via ambulance from home for exacerbation COPD and COVID symptoms diagnosed positive approximately 1 week ago has shortness of breath generalized weakness decreased p o  Intake he is normally on 2 L nasal cannula paramedics had him on 8 L nasal cannula with saturation around 90% currently he is on 3 L in the emergency room  History provided by:  Patient  Medical Problem  Location:  Generalized  Quality:  Weakness and shortness of breath  Severity:  Moderate  Onset quality:  Gradual  Timing:  Constant  Progression:  Worsening  Chronicity:  Recurrent  Context:  Weakness achiness shortness of breath Generalized  Relieved by:  Nothing  Worsened by:  Exertion  Associated symptoms: cough, fatigue, myalgias and shortness of breath    Associated symptoms: no abdominal pain and no fever        Prior to Admission Medications   Prescriptions Last Dose Informant Patient Reported?  Taking?   acetaminophen (TYLENOL) 500 mg tablet   Yes No   Sig: Take 1,000 mg by mouth every 6 (six) hours as needed   albuterol (PROVENTIL HFA,VENTOLIN HFA) 90 mcg/act inhaler   Yes No   apixaban (ELIQUIS) 2 5 mg   No No   Sig: Take 1 tablet (2 5 mg total) by mouth 2 (two) times a day   aspirin 81 mg chewable tablet   Yes No   Sig: Chew 81 mg daily   azithromycin (ZITHROMAX) 250 mg tablet   No No   Sig: Take 1 tablet (250 mg total) by mouth every 24 hours for 4 days Take 2 tablets today then 1 tablet daily x 4 days   chlorthalidone 25 mg tablet   Yes No   Sig: Take 25 mg by mouth daily   erythromycin (ILOTYCIN) ophthalmic ointment   No No   Sig: Administer 0 5 inches to both eyes every 8 (eight) hours   fluticasone-umeclidinium-vilanterol (Trelegy Ellipta) 100-62 5-25 MCG/INH inhaler   Yes No   metoprolol tartrate (LOPRESSOR) 25 mg tablet   No No   Sig: Take 0 5 tablets (12 5 mg total) by mouth every 12 (twelve) hours   mirtazapine (REMERON) 30 mg tablet   Yes No   Sig: Take 30 mg by mouth daily at bedtime   ondansetron (ZOFRAN-ODT) 4 mg disintegrating tablet   No No   Sig: Take 1 tablet (4 mg total) by mouth every 6 (six) hours as needed for nausea or vomiting   oxyCODONE (OXY-IR) 5 MG capsule   Yes No   Sig: Take 5 mg by mouth every 8 (eight) hours   oxyCODONE (OXY-IR) 5 MG capsule   Yes No   Sig: Take 5 mg by mouth   predniSONE 10 mg tablet   Yes No   Sig: Take by mouth daily   predniSONE 20 mg tablet   No No   Sig: Take 3 tablets (60 mg total) by mouth daily for 4 days   risperiDONE (RisperDAL) 0 5 mg tablet   Yes No      Facility-Administered Medications: None       Past Medical History:   Diagnosis Date    [facility])     COVID-19     [facility]se[facility] lung ([facility] [address] )     Kidney failure     MI, old        Past Surgical History:   Procedure Laterality Date    BACK SURGERY      BELOW KNEE LEG AMPUTATION      CARDIAC SURGERY      HERNIA REPAIR         History reviewed  No pertinent family history  I have reviewed and agree with the history as documented  E-Cigarette/Vaping    E-Cigarette Use Never User      E-Cigarette/Vaping Substances    Nicotine No     Flavoring No      Social History     Tobacco Use    Smoking status: Current Every Day Smoker     Packs/day: 1 00     Types: Cigarettes    Smokeless tobacco: Never Used   Substance Use Topics    Alcohol use: Yes     Frequency: Monthly or less     Comment: socially     Drug use: Not Currently       Review of Systems   Constitutional: Positive for fatigue  Negative for fever  Respiratory: Positive for cough and shortness of breath  Gastrointestinal: Negative for abdominal pain  Musculoskeletal: Positive for myalgias  Neurological: Positive for weakness (Generalized)  All other systems reviewed and are negative        Physical Exam  Physical Exam  Vitals signs and nursing note reviewed  Constitutional:       Appearance: He is not ill-appearing, toxic-appearing or diaphoretic  HENT:      Head: Normocephalic and atraumatic  Right Ear: External ear normal       Left Ear: External ear normal    Eyes:      General: No scleral icterus  Right eye: No discharge  Left eye: No discharge  Extraocular Movements: Extraocular movements intact  Pupils: Pupils are equal, round, and reactive to light  Neck:      Musculoskeletal: Normal range of motion and neck supple  No neck rigidity or muscular tenderness  Cardiovascular:      Rate and Rhythm: Normal rate  Rhythm irregular  Pulses: Normal pulses  Heart sounds: Normal heart sounds  No murmur  No friction rub  No gallop  Pulmonary:      Breath sounds: Rhonchi present  No wheezing or rales  Comments: Decreased breath sounds bilateral  Abdominal:      General: Abdomen is flat  Bowel sounds are normal  There is no distension  Tenderness: There is no abdominal tenderness  There is no guarding or rebound  Musculoskeletal: Normal range of motion  General: No tenderness  Right lower leg: No edema  Left lower leg: No edema  Comments: Left AKA   Skin:     General: Skin is warm and dry  Findings: No erythema or rash  Neurological:      General: No focal deficit present  Mental Status: He is alert and oriented to person, place, and time  Cranial Nerves: No cranial nerve deficit  Sensory: No sensory deficit  Coordination: Coordination normal    Psychiatric:         Mood and Affect: Mood normal          Behavior: Behavior normal          Thought Content:  Thought content normal          Vital Signs  ED Triage Vitals   Temperature Pulse Respirations Blood Pressure SpO2   05/17/21 1659 05/17/21 1649 05/17/21 1649 05/17/21 1649 05/17/21 1649   97 8 °F (36 6 °C) 85 20 134/79 100 %      Temp Source Heart Rate Source Patient Position - Orthostatic VS BP Location FiO2 (%)   05/17/21 1659 05/17/21 1830 05/17/21 1649 05/17/21 1649 --   Temporal Monitor Lying Right arm       Pain Score       --                  Vitals:    05/17/21 1649 05/17/21 1830   BP: 134/79 116/65   Pulse: 85 94   Patient Position - Orthostatic VS: Lying Lying         Visual Acuity  Visual Acuity      Most Recent Value   L Pupil Size (mm)  3   R Pupil Size (mm)  3          ED Medications  Medications   potassium chloride 20 mEq IVPB (premix) (20 mEq Intravenous New Bag 5/17/21 1825)   sodium chloride 0 9 % bolus 500 mL (has no administration in time range)   cefTRIAXone (ROCEPHIN) IVPB (premix in dextrose) 2,000 mg 50 mL (0 mg Intravenous Stopped 5/17/21 1828)   dexamethasone (PF) (DECADRON) injection 6 mg (6 mg Intravenous Given 5/17/21 1758)   doxycycline hyclate (VIBRAMYCIN) capsule 100 mg (100 mg Oral Given 5/17/21 1827)       Diagnostic Studies  Results Reviewed     Procedure Component Value Units Date/Time    Lactic acid, plasma [[id number]]     Lab Status: No result Specimen: Blood     D-dimer, quantitative [[id number]]     Lab Status: No result Specimen: Blood     CK (with reflex to MB) [[id number]]     Lab Status: No result Specimen: Blood     Lactic acid 2 Hours [[id number]]  (Abnormal) Collected: 05/17/21 1914    Lab Status: Final result Specimen: Blood from Arm, Left Updated: 05/17/21 1955     LACTIC ACID 2 1 mmol/L     Narrative:      Result may be elevated if tourniquet was used during collection      Comprehensive metabolic panel [[id number]]  (Abnormal) Collected: 05/17/21 1708    Lab Status: Final result Specimen: Blood from Arm, Right Updated: 05/17/21 1755     Sodium 142 mmol/L      Potassium 2 7 mmol/L      Chloride 99 mmol/L      CO2 31 mmol/L      ANION GAP 12 mmol/L      BUN 31 mg/dL      Creatinine 1 26 mg/dL      Glucose 84 mg/dL      Calcium 8 6 mg/dL      Corrected Calcium 9 3 mg/dL      AST 41 U/L      ALT 22 U/L      Alkaline Phosphatase 120 U/L      Total Protein 7 6 g/dL      Albumin 3 1 g/dL      Total Bilirubin 1 10 mg/dL      eGFR 53 ml/min/1 73sq m     Narrative:      [place] guidelines for Chronic Kidney Disease (CKD):     Stage 1 with normal or high GFR (GFR > 90 mL/min/1 73 square meters)    Stage 2 Mild CKD (GFR = 60-89 mL/min/1 73 square meters)    Stage 3A Moderate CKD (GFR = 45-59 mL/min/1 73 square meters)    Stage 3B Moderate CKD (GFR = 30-44 mL/min/1 73 square meters)    Stage 4 Severe CKD (GFR = 15-29 mL/min/1 73 square meters)    Stage 5 End Stage CKD (GFR <15 mL/min/1 73 square meters)  Note: GFR calculation is accurate only with a steady state creatinine    NT-BNP PRO [[id number]]  (Abnormal) Collected: 05/17/21 1708    Lab Status: Final result Specimen: Blood from Arm, Right Updated: 05/17/21 1755     NT-proBNP 4,342 pg/mL     C-reactive protein [[id number]]  (Abnormal) Collected: 05/17/21 1708    Lab Status: Final result Specimen: Blood from Arm, Right Updated: 05/17/21 1755      3 mg/L     Lactic acid [[id number]]  (Abnormal) Collected: 05/17/21 1708    Lab Status: Final result Specimen: Blood from Arm, Right Updated: 05/17/21 1751     LACTIC ACID 2 7 mmol/L     Narrative:      Result may be elevated if tourniquet was used during collection      Protime-INR [[id number]]  (Normal) Collected: 05/17/21 1708    Lab Status: Final result Specimen: Blood from Arm, Right Updated: 05/17/21 1748     Protime 12 2 seconds      INR 0 91    APTT [[id number]]  (Normal) Collected: 05/17/21 1708    Lab Status: Final result Specimen: Blood from Arm, Right Updated: 05/17/21 1748     PTT 29 seconds     Troponin I [[id number]]  (Abnormal) Collected: 05/17/21 1708    Lab Status: Final result Specimen: Blood from Arm, Right Updated: 05/17/21 1744     Troponin I 0 06 ng/mL     CBC and differential [[id number]]  (Abnormal) Collected: 05/17/21 1708    Lab Status: Final result Specimen: Blood from Arm, Right Updated: 05/17/21 1723     WBC 5 75 Thousand/uL      RBC 4 80 Million/uL      Hemoglobin 15 2 g/dL      Hematocrit 44 9 %      MCV 94 fL      MCH 31 7 pg      MCHC 33 9 g/dL      RDW 13 8 %      MPV 10 4 fL      Platelets 784 Thousands/uL      nRBC 0 /100 WBCs      Neutrophils Relative 78 %      Immat GRANS % 1 %      Lymphocytes Relative 17 %      Monocytes Relative 4 %      Eosinophils Relative 0 %      Basophils Relative 0 %      Neutrophils Absolute 4 55 Thousands/µL      Immature Grans Absolute 0 03 Thousand/uL      Lymphocytes Absolute 0 95 Thousands/µL      Monocytes Absolute 0 20 Thousand/µL      Eosinophils Absolute 0 00 Thousand/µL      Basophils Absolute 0 02 Thousands/µL     Ferritin [[id number]] Collected: 05/17/21 1708    Lab Status: In process Specimen: Blood from Arm, Right Updated: 05/17/21 1720    Procalcitonin with AM Reflex [[id number]] Collected: 05/17/21 1708    Lab Status: In process Specimen: Blood from Arm, Right Updated: 05/17/21 1720    Blood culture #2 [[id number]] Collected: 05/17/21 1708    Lab Status: In process Specimen: Blood from Arm, Right Updated: 05/17/21 1720    Blood culture #1 [[id number]] Collected: 05/17/21 1713    Lab Status:  In process Specimen: Blood from Arm, Right Updated: 05/17/21 1720    POCT Blood Gas (CG8+) [[id number]]  (Abnormal) Collected: 05/17/21 1709    Lab Status: Final result Specimen: Venous Updated: 05/17/21 1714     ph, Ayad ISTAT 7 435     pCO2, Ayad i-STAT 44 5 mm HG      pO2, Ayad i-STAT 22 0 mm HG      BE, i-STAT 5 mmol/L      HCO3, Ayad i-STAT 29 9 mmol/L      CO2, i-STAT 31 mmol/L      O2 Sat, i-STAT 39 %      SODIUM, I-STAT 138 mmol/l      Potassium, i-STAT 2 7 mmol/L      Calcium, Ionized i-STAT 1 05 mmol/L      Hct, i-STAT 39 %      Hgb, i-STAT 13 3 g/dl      Glucose, i-STAT 92 mg/dl      Specimen Type VENOUS                 XR chest 1 view portable   ED Interpretation by Carly Morton DO (05/17 1748)   Patchy right lower lobe infiltrate Procedures  ECG 12 Lead Documentation Only    Date/Time: 5/17/2021 5:02 PM  Performed by: Benja Hutton DO  Authorized by: Benja Hutton DO     ECG reviewed by me, the ED Provider: yes    Patient location:  ED  Rate:     ECG rate:  86  Rhythm:     Rhythm: sinus rhythm    Ectopy:     Ectopy: PAC    ST segments:     ST segments:  Non-specific             ED Course             HEART Risk Score      Most Recent Value   Heart Score Risk Calculator   History  0 Filed at: 05/17/2021 1802   ECG  1 Filed at: 05/17/2021 1802   Age  2 Filed at: 05/17/2021 1802   Risk Factors  1 Filed at: 05/17/2021 1802   Troponin  1 Filed at: 05/17/2021 1802   HEART Score  5 Filed at: 05/17/2021 1802                  Initial Sepsis Screening     Row Name 05/17/21 1803                Is the patient's history suggestive of a new or worsening infection? (!) Yes (Proceed) known covid positive  -CADE        Suspected source of infection  pneumonia  -CADE        Are two or more of the following signs & symptoms of infection both present and new to the patient? No  -CADE        Indicate SIRS criteria  --        If the answer is yes to both questions, suspicion of sepsis is present  --        If severe sepsis is present AND tissue hypoperfusion perists in the hour after fluid resuscitation or lactate > 4, the patient meets criteria for SEPTIC SHOCK  --        Are any of the following organ dysfunction criteria present within 6 hours of suspected infection and SIRS criteria that are NOT considered to be chronic conditions?   --        Organ dysfunction  --        Date of presentation of severe sepsis  --        Time of presentation of severe sepsis  --        Tissue hypoperfusion persists in the hour after crystalloid fluid administration, evidenced, by either:  --        Was hypotension present within one hour of the conclusion of crystalloid fluid administration?  --        Date of presentation of septic shock  --        Time of presentation of septic shock  --          User Key  (r) = Recorded By, (t) = Taken By, (c) = Cosigned By    [address] Name Provider Type    [address] B[address] DO Physician          SBIRT 22yo+      Most Recent Value   SBIRT (23 yo +)   In order to provide better care to our patients, we are screening all of our patients for alcohol and drug use  Would it be okay to ask you these screening questions?   No Filed at: 05/17/2021 1651                    MDM  Number of Diagnoses or Management Options  Diagnosis management comments: Shortness of breath rule out COPD exacerbation versus COVID-19 or congestive heart failure workup in progress including EKG blood work and chest x-ray       Amount and/or Complexity of Data Reviewed  Clinical lab tests: ordered  Tests in the radiology section of CPT®: ordered        Disposition  Final diagnoses:   COVID-19   Pneumonia   Hypoxemia   Hypokalemia   Elevated troponin - Chronically   Lactic acidosis     Time reflects when diagnosis was documented in both MDM as applicable and the Disposition within this note     Time User Action Codes Description Comment    5/17/2021  6:07 PM Roizna Saint Add [U07 1] COVID-19     5/17/2021  6:07 PM Dariana Mantis [J18 9] Pneumonia     5/17/2021  6:07 PM Rozina Saint Add [E87 6] Hypokalemia     5/17/2021  6:08 PM Rozina Saint Add [E87 2] Lactic acidosis     5/17/2021  6:08 PM Dariana Mantis [R77 8] Elevated troponin     5/17/2021  6:08 PM Jesusita Lipps [R77 8] Elevated troponin Chronically    5/17/2021  6:08 PM [place] Moreno [R77 8] Elevated troponin Chronically    5/17/2021  6:08 PM [place] [E87 6] Hypokalemia     5/17/2021  6:08 PM Rozina Saint Remove [E87 2] Lactic acidosis     5/17/2021  6:08 PM Rozina Saint Add [R09 02] Hypoxemia     5/17/2021  6:08 PM Rozina Saint Add [E87 6] Hypokalemia     5/17/2021  6:09 PM Rozina Saint Add [R77 8] Elevated troponin     5/17/2021  6:09 PM Rozina Saint Modify [R77 8] Elevated troponin Chronically    5/17/2021  6:38 PM Malika Wiggins Add [E87 2] Lactic acidosis       ED Disposition     ED Disposition Condition Date/Time Comment    Admit Stable Mon May 17, 2021  6:38 PM Case was discussed with *Nel Traylor** and the patient's admission status was agreed to be Admission Status: inpatient status to the service of Dr Ulla Epley   Follow-up Information    None         Patient's Medications   Discharge Prescriptions    No medications on file     No discharge procedures on file      PDMP Review     None          ED Provider  Electronically Signed by           Mira Chamberlain DO  05/17/21 2016

## 2021-05-18 VITALS
SYSTOLIC BLOOD PRESSURE: 120 MMHG | TEMPERATURE: 96.9 F | DIASTOLIC BLOOD PRESSURE: 56 MMHG | HEIGHT: 67 IN | HEART RATE: 90 BPM | OXYGEN SATURATION: 89 % | WEIGHT: 109.35 LBS | BODY MASS INDEX: 17.16 KG/M2 | RESPIRATION RATE: 34 BRPM

## 2021-05-18 PROBLEM — Z89.612 S/P AKA (ABOVE KNEE AMPUTATION) UNILATERAL, LEFT (HCC): Status: ACTIVE | Noted: 2019-09-25

## 2021-05-18 PROBLEM — I50.22 CHRONIC SYSTOLIC HEART FAILURE (HCC): Status: ACTIVE | Noted: 2020-01-10

## 2021-05-18 PROBLEM — I71.2 THORACIC AORTIC ANEURYSM WITHOUT RUPTURE (HCC): Status: ACTIVE | Noted: 2021-05-18

## 2021-05-18 PROBLEM — E87.6 HYPOKALEMIA: Status: RESOLVED | Noted: 2021-05-17 | Resolved: 2021-05-18

## 2021-05-18 LAB — LACTATE SERPL-SCNC: 1.5 MMOL/L (ref 0.5–2)

## 2021-05-18 PROCEDURE — 83605 ASSAY OF LACTIC ACID: CPT | Performed by: INTERNAL MEDICINE

## 2021-05-18 PROCEDURE — 97167 OT EVAL HIGH COMPLEX 60 MIN: CPT

## 2021-05-18 PROCEDURE — 99239 HOSP IP/OBS DSCHRG MGMT >30: CPT | Performed by: INTERNAL MEDICINE

## 2021-05-18 PROCEDURE — 97163 PT EVAL HIGH COMPLEX 45 MIN: CPT

## 2021-05-18 RX ORDER — MELATONIN
2000 DAILY
Qty: 30 TABLET | Refills: 0 | Status: SHIPPED | OUTPATIENT
Start: 2021-05-19 | End: 2021-05-18

## 2021-05-18 RX ORDER — FAMOTIDINE 20 MG/1
20 TABLET, FILM COATED ORAL DAILY
Qty: 60 TABLET | Refills: 0 | Status: SHIPPED | OUTPATIENT
Start: 2021-05-19

## 2021-05-18 RX ORDER — MELATONIN
2000 DAILY
Qty: 30 TABLET | Refills: 0 | Status: SHIPPED | OUTPATIENT
Start: 2021-05-19

## 2021-05-18 RX ORDER — ZINC SULFATE 50(220)MG
220 CAPSULE ORAL DAILY
Qty: 6 CAPSULE | Refills: 0 | Status: SHIPPED | OUTPATIENT
Start: 2021-05-19 | End: 2021-05-25

## 2021-05-18 RX ORDER — FAMOTIDINE 20 MG/1
20 TABLET, FILM COATED ORAL DAILY
Qty: 60 TABLET | Refills: 0 | Status: SHIPPED | OUTPATIENT
Start: 2021-05-19 | End: 2021-05-18

## 2021-05-18 RX ORDER — PREDNISONE 10 MG/1
TABLET ORAL
Qty: 30 TABLET | Refills: 0 | Status: SHIPPED | OUTPATIENT
Start: 2021-05-18 | End: 2021-05-18 | Stop reason: SDUPTHER

## 2021-05-18 RX ORDER — PREDNISONE 10 MG/1
TABLET ORAL
Qty: 30 TABLET | Refills: 0 | Status: SHIPPED | OUTPATIENT
Start: 2021-05-18 | End: 2021-05-30

## 2021-05-18 RX ORDER — ZINC SULFATE 50(220)MG
220 CAPSULE ORAL DAILY
Qty: 6 CAPSULE | Refills: 0 | Status: SHIPPED | OUTPATIENT
Start: 2021-05-19 | End: 2021-05-18

## 2021-05-18 RX ADMIN — DEXAMETHASONE SODIUM PHOSPHATE 6 MG: 4 INJECTION, SOLUTION INTRA-ARTICULAR; INTRALESIONAL; INTRAMUSCULAR; INTRAVENOUS; SOFT TISSUE at 08:40

## 2021-05-18 RX ADMIN — METOPROLOL TARTRATE 12.5 MG: 25 TABLET, FILM COATED ORAL at 08:39

## 2021-05-18 RX ADMIN — SODIUM CHLORIDE 500 ML: 0.9 INJECTION, SOLUTION INTRAVENOUS at 00:01

## 2021-05-18 RX ADMIN — DOXYCYCLINE 100 MG: 100 CAPSULE ORAL at 08:39

## 2021-05-18 RX ADMIN — APIXABAN 2.5 MG: 2.5 TABLET, FILM COATED ORAL at 08:38

## 2021-05-18 RX ADMIN — ASPIRIN 81 MG: 81 TABLET, CHEWABLE ORAL at 08:38

## 2021-05-18 RX ADMIN — ZINC SULFATE 220 MG (50 MG) CAPSULE 220 MG: CAPSULE at 08:39

## 2021-05-18 RX ADMIN — Medication 2000 UNITS: at 08:38

## 2021-05-18 RX ADMIN — NICOTINE 1 PATCH: 21 PATCH, EXTENDED RELEASE TRANSDERMAL at 08:39

## 2021-05-18 RX ADMIN — OXYCODONE HYDROCHLORIDE AND ACETAMINOPHEN 1000 MG: 500 TABLET ORAL at 08:38

## 2021-05-18 RX ADMIN — FLUTICASONE FUROATE AND VILANTEROL TRIFENATATE 1 PUFF: 100; 25 POWDER RESPIRATORY (INHALATION) at 08:40

## 2021-05-18 RX ADMIN — CHLORTHALIDONE 25 MG: 25 TABLET ORAL at 08:39

## 2021-05-18 RX ADMIN — FAMOTIDINE 20 MG: 20 TABLET, FILM COATED ORAL at 08:38

## 2021-05-18 NOTE — ASSESSMENT & PLAN NOTE
· History of AFib   · During last admission patient went into rapid AFib    · Continue Eliquis 2 5 mg b i d  and metoprolol 12 5 mg b i d   · EKG:  Sinus rhythm with PACs at 86 beats per minute

## 2021-05-18 NOTE — PLAN OF CARE
Problem: OCCUPATIONAL THERAPY ADULT  Goal: Performs self-care activities at highest level of function for planned discharge setting  See evaluation for individualized goals  Description: Treatment Interventions: ADL retraining, Functional transfer training, Compensatory technique education, Patient/family training, Equipment evaluation/education, Endurance training          See flowsheet documentation for full assessment, interventions and recommendations  Note: Limitation: Decreased ADL status, Decreased self-care trans, Decreased high-level ADLs  Prognosis: Fair  Assessment: Pt is a 79 y o  male seen for OT evaluation at [facility], admitted 5/17/2021 w/ Pneumonia due to COVID-19 virus  OT completed extensive review of pt's medical and social history  Comorbidities affecting pt's functional performance at time of assessment include: elevated troponin, tobacco use, hx of left BKA, COPD, essential HTN, etc (see chart for additional hx)   Personal factors affecting pt at time of IE include:difficulty performing ADLS, difficulty performing IADLS  and decreased functional mobility  Prior to admission, pt was living in an in-law suite of daughter's home with stair glide access  Pt was required assist w/  ADLS and IADLS & required use of wheelchair PTA  Upon evaluation: Pt requires supervision for bed mobility, supervision-CGA for functional mobility/transfers, supervision for UB ADLs and mod A for LB ADLS 2* the following deficits impacting occupational performance: decreased strength, decreased balance and orthopedic restrictions  Pt to benefit from continued skilled OT tx while in the hospital to address deficits as defined above and maximize level of functional independence w ADL's and functional mobility  Occupational Performance areas to address include: bathing/shower, toilet hygiene, dressing and functional mobility  Based on findings, pt is of high complexity   The patient's raw score on the AM-PAC Daily Activity inpatient short form is 21, standardized score is 44 27, greater than 39 4  Patients at this level are likely to benefit from DC to home  Please refer to the recommendation of the Occupational Therapist for safe DC planning  At this time, OT recommendations at time of discharge are home OT/home with family support       OT Discharge Recommendation: Home with home health rehabilitation

## 2021-05-18 NOTE — ASSESSMENT & PLAN NOTE
· Diagnosed with COVID on 05/04  Had previously been in the [facility] 05/04 [facility]  · Patient received remdesivir, dexamethasone  Received 2 days worth of IV ceftriaxone and doxycycline but were discontinued due to negative procalcitonin  · Comes to the ED due to continued shortness of breath  Placed on 3 L nasal cannula (Chronically on 2L)   · Admit under mild pathway  · Inflammatory Markers   · D-Dimer: Ordered (elevated during last admission)   · Unable to have contrast due to allergy  · Patient is on eliquis 2 5 mg BID due to afib history, continue   · CRP: 100 3  · Ferritin pending   · Cardiac Markers   · Troponin: 0 06 (chronically elevated)  · BNP: 4,342 (decreased from discharge 5100)  · CK: ordered   · Continue ceftriaxone and doxycycline  D/C if procalcitonin negative x2  · Continue dexamethasone and vitamins   · Don't start remdesivir due to past first week of symptoms and patient already received during prior admission

## 2021-05-18 NOTE — ASSESSMENT & PLAN NOTE
· Chronically on 2 L NC due to COPD   · Required 3 L due to COVID-19 pneumonia  · Now baseline oxygen requirement and stable for return home

## 2021-05-18 NOTE — H&P
Hans Keyshawnon  H&P- Tracy Avila 1940, 79 y o  male MRN: [id number]  Unit/Bed#: ED 08 Encounter: [id number]  Primary Care Provider: No primary care provider on file  Date and time admitted to hospital: 5/17/2021  4:50 PM    * Pneumonia due to COVID-19 virus  Assessment & Plan  · Diagnosed with COVID on 05/04  Had previously been in the hospital from 05/04 through 5/7 due to [place]  · Patient received remdesivir, dexamethasone  Received 2 days worth of IV ceftriaxone and doxycycline but were discontinued due to negative procalcitonin  · Comes to the ED due to continued shortness of breath  Placed on 3 L nasal cannula (Chronically on 2L)   · Admit under mild pathway  · Inflammatory Markers   · D-Dimer: Ordered (elevated during last admission)   · Unable to have contrast due to allergy  · Patient is on eliquis 2 5 mg BID due to afib history, continue   · CRP: 100 3  · Ferritin pending   · Cardiac Markers   · Troponin: 0 06 (chronically elevated)  · BNP: 4,342 (decreased from discharge 5100)  · CK: ordered   · Continue ceftriaxone and doxycycline  D/C if procalcitonin negative x2  · Continue dexamethasone and vitamins   · Don't start remdesivir due to past first week of symptoms and patient already received during prior admission  Elevated lactic acid level  Assessment & Plan  · Lactic Acid 2 7 on admission   Covid 19 infection   · 2 hour lactic elevated at 2 1  · Vitals stable, does not meet SIRS/Sepsis at this time   · Order 500 ml bolus   · Continue to trend lactic till cleared    Hypokalemia  Assessment & Plan  · Potassium 2 7 on admission   · In the ED patient received 20 meq IV potassium  · Continue to monitor and replete as needed    PAD (peripheral artery disease) ([facility] [address] )  Assessment & Plan  · History of PAD s/p left BKA   · On cilostazol and aspirin    Paroxysmal atrial fibrillation (HCC)  Assessment & Plan  · History of AFib   · During last admission patient went into rapid AFib  · Continue Eliquis 2 5 mg b i d  and metoprolol 12 5 mg b i d   · EKG:  Sinus rhythm with PACs at 86 beats per minute    Tobacco abuse  Assessment & Plan  · Continues to smoke 1 ppd while on oxygen  · Encourage smoking cessation   · Nicotine patch ordered    Essential hypertension  Assessment & Plan  · Continue chlorthalidone 25 mg daily and metoprolol tartrate 12 5 mg b i d  · Blood pressure controlled in the ED   · Continue to monitor    Chronic respiratory failure with hypoxia (HCC)  Assessment & Plan  · Chronically on 2 L NC due to COPD   · Currently requiring 3L due to Covid 19 pneumonia     VTE Prophylaxis: Apixaban (Eliquis)  / sequential compression device   Code Status: Level 3 DNR/DNI  POLST: There is no POLST form on file for this patient (pre-hospital)  Discussion with family: Denied by patient tonight  Wants them updated tomorrow     Anticipated Length of Stay:  Patient will be admitted on an Inpatient basis with an anticipated length of stay of  > 2 midnights  Justification for Hospital Stay: Covid 19 pneumonia     Total Time for Visit, including Counseling / Coordination of Care: 60 minutes  Greater than 50% of this total time spent on direct patient counseling and coordination of care  Chief Complaint:   SOB     History of Present Illness:    Vanessa Joyner is a 79 y o  male with PMH of COPD on 2L, tobacco abuse, HTN, tobacco abuse, PAD, who presents due to increasing sob and cough at home  Patient recently admitted from 5/4-5/7 due to Covid 19  At baseline patient wears 2 L NC due to COPD but patient has been increasing it to 3L at home to help with increasing sob  In the ED patient is resting comfortably at 95% on 3 L  Denies known fevers but has been feeling chills  Increase in his chronic cough  Continues to smoke 1 ppd  Encouraged patient to quit or decrease how much he smokes due to this making his breathing worse  Patient has been compliant with his home medications  Review of Systems:    Review of Systems   Constitutional: Positive for fatigue  Negative for fever  HENT: Negative for sore throat  Respiratory: Positive for cough and shortness of breath  Negative for chest tightness  Cardiovascular: Negative for chest pain  Gastrointestinal: Negative for abdominal distention, abdominal pain, diarrhea, nausea and vomiting  Genitourinary: Negative for difficulty urinating  Musculoskeletal: Negative for arthralgias  Neurological: Negative for weakness and headaches  Psychiatric/Behavioral: Negative for agitation and behavioral problems  All other systems reviewed and are negative  Past Medical and Surgical History:     Past Medical History:   Diagnosis Date    [facility]     COVID-19     [facility] lung [facility]     Kidney failure     MI, old        Past Surgical History:   Procedure Laterality Date    BACK SURGERY      BELOW KNEE LEG AMPUTATION      CARDIAC SURGERY      HERNIA REPAIR         Meds/Allergies:    Prior to Admission medications    Medication Sig Start Date End Date Taking?  Authorizing Provider   acetaminophen (TYLENOL) 500 mg tablet Take 1,000 mg by mouth every 6 (six) hours as needed    Historical Provider, MD   albuterol (PROVENTIL HFA,VENTOLIN HFA) 90 mcg/act inhaler  3/29/21   Historical Provider, MD   apixaban (ELIQUIS) 2 5 mg Take 1 tablet (2 5 mg total) by mouth 2 (two) times a day 5/7/21 6/6/21  Ashkan Aguilar MD   aspirin 81 mg chewable tablet Chew 81 mg daily    Historical Provider, MD   azithromycin (ZITHROMAX) 250 mg tablet Take 1 tablet (250 mg total) by mouth every 24 hours for 4 days Take 2 tablets today then 1 tablet daily x 4 days 5/14/21 5/18/21  Qiaan Chen DO   chlorthalidone 25 mg tablet Take 25 mg by mouth daily    Historical Provider, MD   erythromycin (ILOTYCIN) ophthalmic ointment Administer 0 5 inches to both eyes every 8 (eight) hours 1/12/21   Jarred WHIPPLE Merribeth Goodpasture, MD   fluticasone-umeclidinium-vilanterol (Trelegy Ellipta) 100-62 5-25 MCG/INH inhaler  12/4/20   Historical Provider, MD   metoprolol tartrate (LOPRESSOR) 25 mg tablet Take 0 5 tablets (12 5 mg total) by mouth every 12 (twelve) hours 5/7/21 6/6/21  Maciel Sevilla MD   mirtazapine (REMERON) 30 mg tablet Take 30 mg by mouth daily at bedtime    Historical Provider, MD   ondansetron (ZOFRAN-ODT) 4 mg disintegrating tablet Take 1 tablet (4 mg total) by mouth every 6 (six) hours as needed for nausea or vomiting 1/12/21   Ayla Acosta DO   oxyCODONE (OXY-IR) 5 MG capsule Take 5 mg by mouth every 8 (eight) hours    Historical Provider, MD   oxyCODONE (OXY-IR) 5 MG capsule Take 5 mg by mouth    Historical Provider, MD   predniSONE 10 mg tablet Take by mouth daily    Historical Provider, MD   predniSONE 20 mg tablet Take 3 tablets (60 mg total) by mouth daily for 4 days 5/14/21 5/18/21  Ayla Acosta DO   risperiDONE (RisperDAL) 0 5 mg tablet  4/7/21   Historical Provider, MD     I have reviewed home medications with patient personally  Allergies: Allergies   Allergen Reactions    Iodine - Food Allergy Anaphylaxis    Iodinated Diagnostic Agents Other (See Comments) and Rash       Social History:     Marital Status: /Civil Union   Occupation: Unknown   Patient Pre-hospital Living Situation: Home with daughter   Patient Pre-hospital Level of Mobility: Limited   Patient Pre-hospital Diet Restrictions: Regular   Substance Use History:   Social History     Substance and Sexual Activity   Alcohol Use Yes    Frequency: Monthly or less    Comment: socially      Social History     Tobacco Use   Smoking Status Current Every Day Smoker    Packs/day: 1 00    Types: Cigarettes   Smokeless Tobacco Never Used     Social History     Substance and Sexual Activity   Drug Use Not Currently       Family History:    History reviewed  No pertinent family history      Physical Exam:     Vitals:   Blood Pressure: 166/79 (05/17/21 2247)  Pulse: 77 (05/17/21 2247)  Temperature: 97 8 °F (36 6 °C) (05/17/21 1659)  Temp Source: Temporal (05/17/21 1659)  Respirations: 20 (05/17/21 2247)  Weight - Scale: 49 9 kg (110 lb) (05/17/21 1648)  SpO2: 95 % (05/17/21 2247)    Physical Exam  Vitals signs and nursing note reviewed  Constitutional:       Appearance: Normal appearance  Interventions: Nasal cannula in place  Comments: Cachectic    HENT:      Head: Normocephalic  Eyes:      Extraocular Movements: Extraocular movements intact  Pupils: Pupils are equal, round, and reactive to light  Neck:      Musculoskeletal: Normal range of motion  Cardiovascular:      Rate and Rhythm: Normal rate and regular rhythm  Heart sounds: No murmur  No gallop  Pulmonary:      Effort: No respiratory distress  Breath sounds: No wheezing  Comments: Decreased breath sounds   Abdominal:      General: Bowel sounds are normal  There is no distension  Tenderness: There is no abdominal tenderness  Musculoskeletal: Normal range of motion  Skin:     General: Skin is warm  Neurological:      General: No focal deficit present  Mental Status: He is alert and oriented to person, place, and time  Mental status is at baseline  Psychiatric:         Mood and Affect: Mood normal          Behavior: Behavior normal          Thought Content: Thought content normal          Additional Data:     Lab Results: I have personally reviewed pertinent reports        Results from last 7 days   Lab Units 05/17/21  1709 05/17/21  1708   WBC Thousand/uL  --  5 75   HEMOGLOBIN g/dL  --  15 2   I STAT HEMOGLOBIN g/dl 13 3  --    HEMATOCRIT %  --  44 9   HEMATOCRIT, ISTAT % 39  --    PLATELETS Thousands/uL  --  220   NEUTROS PCT %  --  78*   LYMPHS PCT %  --  17   MONOS PCT %  --  4   EOS PCT %  --  0     Results from last 7 days   Lab Units 05/17/21  1709 05/17/21  1708   SODIUM mmol/L  --  142   POTASSIUM mmol/L  --  2 7* CHLORIDE mmol/L  --  99*   CO2 mmol/L  --  31   CO2, I-STAT mmol/L 31  --    BUN mg/dL  --  31*   CREATININE mg/dL  --  1 26   ANION GAP mmol/L  --  12   CALCIUM mg/dL  --  8 6   ALBUMIN g/dL  --  3 1*   TOTAL BILIRUBIN mg/dL  --  1 10*   ALK PHOS U/L  --  120*   ALT U/L  --  22   AST U/L  --  41   GLUCOSE RANDOM mg/dL  --  84     Results from last 7 days   Lab Units 05/17/21  1708   INR  0 91             Results from last 7 days   Lab Units 05/17/21  1914 05/17/21  1708   LACTIC ACID mmol/L 2 1* 2 7*   PROCALCITONIN ng/ml  --  <0 05       Imaging: I have personally reviewed pertinent reports  XR chest 1 view portable   ED Interpretation by Elsie Arreaga DO (05/17 1748)   Patchy right lower lobe infiltrate            Allscripts / Epic Records Reviewed: Yes     ** Please Note: This note has been constructed using a voice recognition system   **

## 2021-05-18 NOTE — ASSESSMENT & PLAN NOTE
· Potassium 2 7 on admission   · Now normalized to 4 5  · In the ED patient received 20 meq IV potassium  · Continue to monitor and replete as needed

## 2021-05-18 NOTE — DISCHARGE SUMMARY
Hans Maloney  Discharge- aPtricia Fuentes 1940, 79 y o  male MRN: [id number]  Unit/Bed#: -01 Encounter: [id number]  Primary Care Provider: No primary care provider on file  Date and time admitted to hospital: 5/17/2021  4:50 PM    * Pneumonia due to COVID-19 virus  Assessment & Plan  · Diagnosed with COVID on 05/04  Had previously been in the hospital from 05/04 through 5/7 due to [place]  · Patient received remdesivir, dexamethasone  Received 2 days worth of IV ceftriaxone and doxycycline but were discontinued due to negative procalcitonin  · Comes to the ED due to continued shortness of breath  Placed on 3 L nasal cannula (Chronically on 2L)   · Admit under mild pathway  · Inflammatory Markers   · D-Dimer: Ordered (elevated during last admission)   · Unable to have contrast due to allergy  · Patient is on eliquis 2 5 mg BID due to afib history, continue   · CRP: 100 3  · Ferritin pending   · Cardiac Markers   · Troponin: 0 06 (chronically elevated)  · BNP: 4,342 (decreased from discharge 5100)  · CK: ordered   · Continue ceftriaxone and doxycycline  D/C if procalcitonin negative x2  · Continue dexamethasone and vitamins   · Don't start remdesivir due to past first week of symptoms and patient already received during prior admission  Thoracic aortic aneurysm without rupture [facility])  Assessment & Plan  · Patient should continue to have this monitored outpatient with vascular surgery    Elevated lactic acid level  Assessment & Plan  · Lactic Acid 2 7 on admission   Covid 19 infection   · 2 hour lactic elevated at 2 1, now is normalized  · Vitals stable, does not meet SIRS/Sepsis at this time   · Order 500 ml bolus   · Continue to trend lactic till cleared    PAD (peripheral artery disease) ([facility] [address] )  Assessment & Plan  · History of PAD s/p left BKA   · On cilostazol and aspirin    Paroxysmal atrial fibrillation (HCC)  Assessment & Plan  · History of AFib   · During last admission patient went into rapid AFib  · Continue Eliquis 2 5 mg b i d  and metoprolol 12 5 mg b i d   · EKG:  Sinus rhythm with PACs at 86 beats per minute    Tobacco abuse  Assessment & Plan  · Continues to smoke 1 ppd while on oxygen  · Encourage smoking cessation   · Nicotine patch ordered    Essential hypertension  Assessment & Plan  · Continue chlorthalidone 25 mg daily and metoprolol tartrate 12 5 mg b i d  · Blood pressure controlled in the ED   · Continue to monitor    Chronic respiratory failure with hypoxia (HCC)  Assessment & Plan  · Chronically on 2 L NC due to COPD   · Required 3 L due to COVID-19 pneumonia  · Now baseline oxygen requirement and stable for return home    Hypokalemia-resolved as of 5/18/2021  Assessment & Plan  · Potassium 2 7 on admission   · Now normalized to 4 5  · In the ED patient received 20 meq IV potassium  · Continue to monitor and replete as needed    Resolved Problems  Date Reviewed: 5/18/2021          Resolved    Hypokalemia 5/18/2021     Resolved by  Jd Whipple PA-C        Discharging Physician / Practitioner: Jd Whipple PA-C  PCP: No primary care provider on file  Admission Date:   Admission Orders (From admission, onward)     Ordered        05/17/21 1839  Inpatient Admission  Once                   Discharge Date: 05/18/21    Consultations During Hospital Stay:  · PT/OT    Procedures Performed:   · None    Significant Findings / Test Results:   · CXR 5/17:  · Severe emphysematous changes are noted in mid and upper lung zones  · Right greater than left airspace opacity consistent with some element of scarring and questionable right basilar consolidation  · Ascending thoracic aortic aneurysm, similar from most recent prior examination but probably significantly increased when compared to February 2012 when this measured 23 mm  Incidental Findings:   · See above    Test Results Pending at Discharge (will require follow up):    · None     Outpatient Tests Requested:  · Repeat imaging chest in 3 months to ensure resolution  · Ongoing monitoring of aortic aneurysm to ensure stability    Complications:  None    Reason for Admission:  Shortness of breath    Hospital Course:   Gian Boyce is a 79 y o  male patient past medical history of COPD chronically on 2 L nasal cannula, tobacco abuse, ongoing, hypertension, PAD who originally presented to the hospital on 5/17/2021 due to worsening shortness of breath and cough noted at home  Patient was recently admitted and treated for COVID-19 pneumonia from 5/40 5/7  He is increasing his home oxygen for worsening shortness of breath  Did admit to poor oral intake for 3 days prior to admission and weakness  After fluids and potassium replacement, patient appears to be at baseline functional status as evaluated by PT/OT  Also tolerating baseline nasal cannula  Patient encouraged to stop smoking again this admission  Medically stable for return home with baseline oxygen requirement and VNA services  Hemodynamically stable times discharge and appropriate for outpatient follow-up  Notably, on imaging thoracic aortic aneurysm was noted and patient should continue to have this monitored outpatient to ensure stability  The patient, initially admitted to the hospital as inpatient, was discharged earlier than expected given the following: Baseline oxygen requirement  Please see above list of diagnoses and related plan for additional information  Condition at Discharge: stable    Discharge Day Visit / Exam:   Subjective:  Patient reports his shortness of breath is stable  Offers no additional complaints and is eager for return home    Vitals: Blood Pressure: 120/56 (05/18/21 1141)  Pulse: 90 (05/18/21 1141)  Temperature: (!) 96 9 °F (36 1 °C) (05/18/21 1141)  Temp Source: Axillary (05/18/21 1141)  Respirations: (!) 34 (05/18/21 1141)  Height: 5' 7" (170 2 cm) (05/18/21 0154)  Weight - Scale: 49 6 kg (109 lb 5 6 oz) (05/18/21 0154)  SpO2: (!) 89 % (05/18/21 1141)  Exam:   Physical Exam  Vitals signs and nursing note reviewed  Constitutional:       General: He is not in acute distress  Appearance: Normal appearance  He is well-developed  Interventions: Nasal cannula in place  HENT:      Head: Normocephalic and atraumatic  Eyes:      General: No scleral icterus  Conjunctiva/sclera: Conjunctivae normal    Cardiovascular:      Rate and Rhythm: Normal rate and regular rhythm  Heart sounds: No murmur  Pulmonary:      Effort: Pulmonary effort is normal       Breath sounds: Decreased air movement present  No wheezing, rhonchi or rales  Comments: Coarse  Abdominal:      General: There is no distension  Palpations: Abdomen is soft  Skin:     General: Skin is warm and dry  Neurological:      General: No focal deficit present  Mental Status: He is alert  Psychiatric:         Mood and Affect: Mood normal          Discussion with Family: Updated  (daughter) via phone  Discharge instructions/Information to patient and family:   See after visit summary for information provided to patient and family  Provisions for Follow-Up Care:  See after visit summary for information related to follow-up care and any pertinent home health orders  Disposition:   Home with VNA Services (Reminder: Complete face to face encounter)    Planned Readmission: none     Discharge Statement:  I spent 65 minutes discharging the patient  This time was spent on the day of discharge  I had direct contact with the patient on the day of discharge  Greater than 50% of the total time was spent examining patient, answering all patient questions, arranging and discussing plan of care with patient as well as directly providing post-discharge instructions  Additional time then spent on discharge activities      Discharge Medications:  See after visit summary for reconciled discharge medications provided to patient and/or family        **Please Note: This note may have been constructed using a voice recognition system**

## 2021-05-18 NOTE — PROGRESS NOTES
During am lab draw, patient became upset due to "another needle stick " RN explained importance of lab work and reassessment of his lab values  Patient verbalized understanding and stated he does not want lab work now despite importance  RN validated patient's feelings and asked to give AM erythromycin ointment  Patient refused because, "I am upset"  RN comforted patient  RN notified Jhonathan CHAO of refusal of lab work and am ointment  Patient resting comfortably in bed  Will continue to monitor and pass on to day shift RN   Ruy Pérez, RN

## 2021-05-18 NOTE — ASSESSMENT & PLAN NOTE
· Lactic Acid 2 7 on admission   Covid 19 infection   · 2 hour lactic elevated at 2 1, now is normalized  · Vitals stable, does not meet SIRS/Sepsis at this time   · Order 500 ml bolus   · Continue to trend lactic till cleared

## 2021-05-18 NOTE — CASE MANAGEMENT
LOS: 1 day  Pt is not a documented bundle  Pt is a 30 day readmission  Unplanned readmission score is 28 and yellow   Pt reports he returned to ED due to not feeling well and feeling short of breath  Call placed to Pt outside of Pt's hospital room via magy warner due to COVID + protocol  Pt confirms he lives with his dtr and dtr's boyfriend [place] [address] [place]  Pt confirms  his PCP is Dr Wilma Rosen in [place]  Pt confirms his dtr is POA and he has living will  Pt reports he uses [facility] in [place], has prescription plan and is able to afford medications  Pt reports he has oxygen, walker, cane, shower chair, commode  Pt confirms he has his prosthetic leg at home  Pt reports he has been to [facility] for SNF  Pt denies hx of mental health and drug and alcohol treatment  Pt reports he has PT from [facility] ([facility])  Pt gave permission for CM to speak with his dtr, Nickie re: discharge planning       Call placed to Pt's dtr(Goldie: [phone]), discussed role of case management and discharge planning  Pt's dtr confirms Pt's living environment and functional status from previous admission  Pt lives alone in basement apartment at her house  Pt has chair lift to basement  Pt's dtr reports Pt uses wheelchair and confirmed Pt's DME and informed that Pt's oxygen is through [place] though [facility]  Pt's dtr reports that her boyfriend assists Pt with adls and she and her boyfriend provides transportation, grocery shopping and cooking  Pt's dtr reports that [facility] ([facility]) was going to come down to start PT this week and nurse from [facility] had seen Pt recently  Pt's dtr reports that Pt was supposed to have Televisit with PCP yesterday and was waiting a return call from Pt's PCP to reschedule  Referral sent to [facility] via [address]  Faxed discharge instructions to [facility] ([facility]) at [phone]

## 2021-05-18 NOTE — OCCUPATIONAL THERAPY NOTE
Occupational Therapy Evaluation     Patient Name: Marifer Garsia  NSNGP'Y Date: 5/18/2021  Problem List  Principal Problem:    Pneumonia due to COVID-19 virus  Active Problems:    Chronic respiratory failure with hypoxia ([facility] )    Essential hypertension    Tobacco abuse    Paroxysmal atrial fibrillation (HCC)    PAD (peripheral artery disease) (HCC)    Elevated lactic acid level    Thoracic aortic aneurysm without rupture [facility])    Past Medical History  Past Medical History:   Diagnosis Date    COPD (chronic obstructive pulmonary disease) ([facility] )     COVID-19     [facility]     Hypokalemia 5/17/2021    Kidney failure     MI, old      Past Surgical History  Past Surgical History:   Procedure Laterality Date    BACK SURGERY      BELOW KNEE LEG AMPUTATION      CARDIAC SURGERY      HERNIA REPAIR           05/18/21 0924   OT Last Visit   OT Visit Date 05/18/21   Note Type   Note type Evaluation   Restrictions/Precautions   Braces or Orthoses Prosthesis  (at home)   Other Precautions Fall Risk; Airborne/isolation;Droplet precautions; Bed Alarm; Chair Alarm  (+ COVID-19)   Pain Assessment   Pain Assessment Tool Pain Assessment not indicated - pt denies pain   Home Living   Type of [place] [address] to live on main level with bedroom/bathroom  (Basement level in-law suite with stair lift access )   Bathroom Equipment Shower chair   Bathroom Accessibility Accessible   Home Equipment Wheelchair-manual;Walker;Cane   Additional Comments Was undergoing home therapy   Prior Function   Lives With Family   Receives Help From Family   ADL Assistance Needs assistance   IADLs Needs assistance   Comments Daughter provides meals/transportation   Subjective   Subjective Pt received in semisupine position  Pt agreeable to session     ADL   Eating Assistance 5  Supervision/Setup   Eating Deficit Setup   Grooming Assistance 5  Supervision/Setup   Grooming Deficit Setup   UB Bathing Assistance 5 Supervision/Setup   LB Bathing Assistance 4  Minimal Assistance   UB Dressing Assistance 5  Supervision/Setup   LB Dressing Assistance 3  Moderate Assistance   Toileting Assistance  3  Moderate Assistance   Bed Mobility   Supine to Sit 5  Supervision   Additional Comments Pt remained seated in recliner by end of session   Transfers   Sit to Stand 5  Supervision   Additional items Verbal cues   Stand to Sit 5  Supervision   Additional items Verbal cues   Stand pivot 5  Supervision   Additional items   (Pt maintained UE on bedrail & armrest of chair )   Balance   Static Sitting Good   Dynamic Sitting Fair +   Static Standing Fair +   Dynamic Standing Fair +   Activity Tolerance   Activity Tolerance Patient tolerated treatment well   Medical Staff Made Aware PT Emma   Nurse Made Aware PABLO Lares   RUE Assessment   RUE Assessment WFL   LUE Assessment   LUE Assessment WFL   Cognition   Overall Cognitive Status WFL   Arousal/Participation Alert   Attention Within functional limits   Orientation Level Oriented X4   Memory Within functional limits   Following Commands Follows one step commands without difficulty   Assessment   Limitation Decreased ADL status; Decreased self-care trans;Decreased high-level ADLs   Prognosis Fair   Assessment Pt is a 79 y o  male seen for OT evaluation at [facility], admitted 5/17/2021 w/ Pneumonia due to COVID-19 virus  OT completed extensive review of pt's medical and social history  Comorbidities affecting pt's functional performance at time of assessment include: elevated troponin, tobacco use, hx of left BKA, COPD, essential HTN, etc (see chart for additional hx)   Personal factors affecting pt at time of IE include:difficulty performing ADLS, difficulty performing IADLS  and decreased functional mobility  Prior to admission, pt was living in an in-law suite of daughter's home with stair glide access  Pt was required assist w/  ADLS and IADLS & required use of wheelchair PTA  Upon evaluation: Pt requires supervision for bed mobility, supervision-CGA for functional mobility/transfers, supervision for UB ADLs and mod A for LB ADLS 2* the following deficits impacting occupational performance: decreased strength, decreased balance and orthopedic restrictions  Pt to benefit from continued skilled OT tx while in the hospital to address deficits as defined above and maximize level of functional independence w ADL's and functional mobility  Occupational Performance areas to address include: bathing/shower, toilet hygiene, dressing and functional mobility  Based on findings, pt is of high complexity  The patient's raw score on the AM-PAC Daily Activity inpatient short form is 21, standardized score is 44 27, greater than 39 4  Patients at this level are likely to benefit from DC to home  Please refer to the recommendation of the Occupational Therapist for safe DC planning  At this time, OT recommendations at time of discharge are home OT/home with family support  Goals   Patient Goals Pt wishes to get home   Plan   Treatment Interventions ADL retraining;Functional transfer training; Compensatory technique education;Patient/family training;Equipment evaluation/education; Endurance training   Goal Expiration Date 05/28/21   OT Treatment Day 0   OT Frequency 2-3x/wk   Recommendation   OT Discharge Recommendation Home with home health rehabilitation   [facility] Daily Activity Inpatient   Lower Body Dressing 3   Bathing 3   Toileting 3   Upper Body Dressing 4   Grooming 4   Eating 4   Daily Activity Raw Score 21   Daily Activity Standardized Score (Calc for Raw Score >=11) 44 27   AM-PAC Applied Cognition Inpatient   Following a Speech/Presentation 3   Understanding Ordinary Conversation 4   Taking Medications 4   Remembering Where Things Are Placed or Put Away 4   Remembering List of 4-5 Errands 4   Taking Care of Complicated Tasks 3   Applied Cognition Raw Score 22   Applied Cognition Standardized Score 47 83         Pt will achieve the following goals within 10 days  *Pt will complete UB bathing and dressing with mod I     *Pt will complete LB bathing and dressing with mod I      * Pt will complete toileting w/ mod I w/ G hygiene/thoroughness using DME PRN    *Pt will complete bed mobility with mod I, with bed flat and no side rail to prep for purposeful tasks    *Pt will perform functional transfers with on/off all surfaces with mod I using DME as needed w/ G balance/safety  *Pt will sit on EOB for 20 minutes for increased safety with seated activity tolerance during ADL tasks  *Pt will identify 3-5 fall risks during ADL routine to ensure home safety upon discharge            Sarkis Clayton OTR/L

## 2021-05-18 NOTE — ASSESSMENT & PLAN NOTE
· Potassium 2 7 on admission   · In the ED patient received 20 meq IV potassium  · Continue to monitor and replete as needed

## 2021-05-18 NOTE — ASSESSMENT & PLAN NOTE
· Continue chlorthalidone 25 mg daily and metoprolol tartrate 12 5 mg b i d     · Blood pressure controlled in the ED   · Continue to monitor

## 2021-05-18 NOTE — MALNUTRITION/BMI
This medical record reflects one or more clinical indicators suggestive of underweight  BMI Findings:  Adult BMI Classifications: Underweight < 18 5     Body mass index is 17 13 kg/m²  See Nutrition note dated 5/18/2021 for additional details  Completed nutrition assessment is viewable in the nutrition documentation

## 2021-05-18 NOTE — DISCHARGE INSTRUCTIONS

## 2021-05-18 NOTE — ASSESSMENT & PLAN NOTE
· Lactic Acid 2 7 on admission   Covid 19 infection   · 2 hour lactic elevated at 2 1  · Vitals stable, does not meet SIRS/Sepsis at this time   · Order 500 ml bolus   · Continue to trend lactic till cleared

## 2021-05-18 NOTE — PHYSICAL THERAPY NOTE
PT eval   05/18/21 0920   PT Last Visit   PT Visit Date 05/18/21   Note Type   Note type Evaluation   Pain Assessment   Pain Assessment Tool 0-10   Pain Score No Pain   Home Living   Type of [address] to live on main level with bedroom/bathroom  (lives in basement in-law suite stair glide to basement)   [address] [facility]  (33)   Additional Comments was recently hosp d/c home with VNA and home services, dghtr cooks ind ALDs wc level   Prior Function   Level of [place] Modified independent with wheelchair;Needs assistance with IADLs   Lives With [employer] Help From Family;Home health   ADL Assistance Independent   IADLs Needs assistance   Falls in the last 6 months 0   Vocational Retired   Restrictions/Precautions   Wells Vidhya Bearing Precautions Per Order No   Other Precautions Droplet precautions; Airborne/isolation;Contact/isolation;O2;Telemetry;Multiple lines   General   Additional Pertinent History L AKA wc level, covid + 5/4/2021, smoker, on home 02 after last adm   Family/Caregiver Present No   Cognition   Overall Cognitive Status WFL   Orientation Level Oriented X4   Following Commands Follows one step commands with increased time or repetition   RUE Assessment   RUE Assessment WFL   LUE Assessment   LUE Assessment WFL   RLE Assessment   RLE Assessment WFL   LLE Assessment   LLE Assessment   (AKA no prosthesis, hip wfl)   Coordination   Movements are Fluid and Coordinated 1   Proprioception   RLE Proprioception Grossly intact   LLE Proprioception Grossly Intact   Bed Mobility   Rolling R 7  Independent   Supine to Sit 5  Supervision   Transfers   Sit to Stand 5  Supervision   Stand to Sit 5  Supervision   Stand pivot 5  Supervision   Additional items   (bed rail and chair armrest no AD)   Ambulation/Elevation   Gait pattern   (wc level)   Balance   Static Sitting Good   Dynamic Sitting Good   Static Standing Good   Dynamic Standing Fair +   Endurance Deficit Endurance Deficit No  (02 sat steady on 3-4L)   Activity Tolerance   Activity Tolerance Patient tolerated treatment well   Medical Staff Made Aware OT Corazon   Nurse Made Aware Rn Lakisha   Assessment   Prognosis Good   Problem List Decreased endurance   Assessment Pt adm with sob, vitor increased 02 1-2 l over home levle  Transfers as usual pivot without AD  Appeasr to be at baseline level for moiblity  oob in recliner after session  Appropriate to return home with VNA and home PT OT as prior  No further skilled PT needs at this time   d/c PT   Barriers to Discharge   (medical clearance)   Goals   Patient Goals go home   Plan   PT Frequency   (d/c PT)   Recommendation   PT Discharge Recommendation Home with home health rehabilitation   Equipment Recommended Wheelchair  (has own)   PT - OK to Discharge Yes   Tati Ansari 435   Turning in Bed Without Bedrails 4   Lying on Back to Sitting on Edge of Flat Bed 4   Moving Bed to Chair 4   Standing Up From Chair 4   Walk in Room 4  (wc level)   Climb 3-5 Stairs 1   Basic Mobility Inpatient Raw Score 21   Basic Mobility Standardized Score 45 55   Modified [place] Scale   Modified [place] Scale 2   Fidela Saint, PT

## 2021-05-18 NOTE — PROGRESS NOTES
Pastoral Care Progress Note    2021  Patient: Donavon De La Cruz : 1940  Admission Date & Time: 2021 1650  MRN: [id number] [facility]: [id number]                     Chaplaincy Interventions Utilized:             Chaplaincy Outcomes Achieved:  Declined  support       21 1100   Clinical Encounter Type   Visited With Patient   Routine Visit Introduction   Crisis Visit Critical Care   Referral To   (census/rounds)        21 1100   Clinical Encounter Type   Visited With Patient   Routine Visit Introduction   Crisis Visit Critical Care   Referral To   (census/rounds)

## 2021-05-18 NOTE — DISCHARGE INSTR - AVS FIRST PAGE
Dear Inocente Thompson,     It was our pleasure to care for you here at [facility], [address] [place] [address]  It is our hope that we were always able to exceed the expected standards for your care during your stay  You were hospitalized due to shortness of breath  You were cared for on the 3rd floor by Noe Lion PA-C under the service of Bhupinder Santacruz MD with the [facility] who covers for your primary care physician (PCP), No primary care provider on file  , while you were hospitalized  If you have any questions or concerns related to this hospitalization, you may contact us at [phone]  For follow up as well as any medication refills, we recommend that you follow up with your primary care physician  A registered nurse will reach out to you by phone within a few days after your discharge to answer any additional questions that you may have after going home    However, at this time we provide for you here, the most important instructions / recommendations at discharge:     · Notable Medication Adjustments -   · Vitamin-C, vitamin-D and zinc  · Pepcid twice daily  · Steroid taper  · Testing Required after Discharge -   · Repeat imaging and of chest in 3 months to ensure resolution  · Ongoing monitoring of aortic aneurysm through PCP/vascular surgery  · Important follow up information -   · Follow-up with your primary care provider via [address] within 1 week  · Follow-up with your cardiologist as scheduled, they will be reaching out a new appointment time to comply with quarantine regulations  · Other Instructions -   · Continue to monitor your oxygen saturations at home the oxygen should be between 88-92%  · Please review this entire after visit summary as additional general instructions including medication list, appointments, activity, diet, any pertinent wound care, and other additional recommendations from your care team that may be provided for you       Sincerely,     Elias Chávez PA-C

## 2021-05-18 NOTE — ASSESSMENT & PLAN NOTE
· Continues to smoke 1 ppd while on oxygen    · Encourage smoking cessation   · Nicotine patch ordered

## 2021-05-18 NOTE — PLAN OF CARE
Problem: Potential for Falls  Goal: Patient will remain free of falls  Description: INTERVENTIONS:  - Assess patient frequently for physical needs  -  Identify cognitive and physical deficits and behaviors that affect risk of falls  -  [place] fall precautions as indicated by assessment   - Educate patient/family on patient safety including physical limitations  - Instruct patient to call for assistance with activity based on assessment  - Modify environment to reduce risk of injury  - Consider OT/PT consult to assist with strengthening/mobility  Outcome: Progressing     Problem: Prexisting or High Potential for Compromised Skin Integrity  Goal: Skin integrity is maintained or improved  Description: INTERVENTIONS:  - Identify patients at risk for skin breakdown  - Assess and monitor skin integrity  - Assess and monitor nutrition and hydration status  - Monitor labs   - Assess for incontinence   - Turn and reposition patient  - Assist with mobility/ambulation  - Relieve pressure over bony prominences  - Avoid friction and shearing  - Provide appropriate hygiene as needed including keeping skin clean and dry  - Evaluate need for skin moisturizer/barrier cream  - Collaborate with interdisciplinary team   - Patient/family teaching  - Consider wound care consult   Outcome: Progressing     Problem: Nutrition/Hydration-ADULT  Goal: Nutrient/Hydration intake appropriate for improving, restoring or maintaining nutritional needs  Description: Monitor and assess patient's nutrition/hydration status for malnutrition  Collaborate with interdisciplinary team and initiate plan and interventions as ordered  Monitor patient's weight and dietary intake as ordered or per policy  Utilize nutrition screening tool and intervene as necessary  Determine patient's food preferences and provide high-protein, high-caloric foods as appropriate       INTERVENTIONS:  - Monitor oral intake, urinary output, labs, and treatment plans  - Assess nutrition and hydration status and recommend course of action  - Evaluate amount of meals eaten  - Assist patient with eating if necessary   - Allow adequate time for meals  - Recommend/ encourage appropriate diets, oral nutritional supplements, and vitamin/mineral supplements  - Order, calculate, and assess calorie counts as needed  - Recommend, monitor, and adjust tube feedings and TPN/PPN based on assessed needs  - Assess need for intravenous fluids  - Provide specific nutrition/hydration education as appropriate  - Include patient/family/caregiver in decisions related to nutrition  Outcome: Progressing     Problem: CARDIOVASCULAR - ADULT  Goal: Maintains optimal cardiac output and hemodynamic stability  Description: INTERVENTIONS:  - Monitor I/O, vital signs and rhythm  - Monitor for S/S and trends of decreased cardiac output  - Administer and titrate ordered vasoactive medications to optimize hemodynamic stability  - Assess quality of pulses, skin color and temperature  - Assess for signs of decreased coronary artery perfusion  - Instruct patient to report change in severity of symptoms  Outcome: Progressing     Problem: RESPIRATORY - ADULT  Goal: Achieves optimal ventilation and oxygenation  Description: INTERVENTIONS:  - Assess for changes in respiratory status  - Assess for changes in mentation and behavior  - Position to facilitate oxygenation and minimize respiratory effort  - Oxygen administered by appropriate delivery if ordered  - Initiate smoking cessation education as indicated  - Encourage broncho-pulmonary hygiene including cough, deep breathe, Incentive Spirometry  - Assess the need for suctioning and aspirate as needed  - Assess and instruct to report SOB or any respiratory difficulty  - Respiratory Therapy support as indicated  Outcome: Progressing     Problem: GASTROINTESTINAL - ADULT  Goal: Maintains or returns to baseline bowel function  Description: INTERVENTIONS:  - Assess bowel function  - Encourage oral fluids to ensure adequate hydration  - Administer IV fluids if ordered to ensure adequate hydration  - Administer ordered medications as needed  - Encourage mobilization and activity  - Consider nutritional services referral to assist patient with adequate nutrition and appropriate food choices  Outcome: Progressing     Problem: GENITOURINARY - ADULT  Goal: Maintains or returns to baseline urinary function  Description: INTERVENTIONS:  - Assess urinary function  - Encourage oral fluids to ensure adequate hydration if ordered  - Administer IV fluids as ordered to ensure adequate hydration  - Administer ordered medications as needed  - Offer frequent toileting  - Follow urinary retention protocol if ordered  Outcome: Progressing     Problem: METABOLIC, FLUID AND ELECTROLYTES - ADULT  Goal: Electrolytes maintained within normal limits  Description: INTERVENTIONS:  - Monitor labs and assess patient for signs and symptoms of electrolyte imbalances  - Administer electrolyte replacement as ordered  - Monitor response to electrolyte replacements, including repeat lab results as appropriate  - Instruct patient on fluid and nutrition as appropriate  Outcome: Progressing     Problem: SKIN/TISSUE INTEGRITY - ADULT  Goal: Skin integrity remains intact  Description: INTERVENTIONS  - Identify patients at risk for skin breakdown  - Assess and monitor skin integrity  - Assess and monitor nutrition and hydration status  - Monitor labs (i e  albumin)  - Assess for incontinence   - Turn and reposition patient  - Assist with mobility/ambulation  - Relieve pressure over bony prominences  - Avoid friction and shearing  - Provide appropriate hygiene as needed including keeping skin clean and dry  - Evaluate need for skin moisturizer/barrier cream  - Collaborate with interdisciplinary team (i e  Nutrition, Rehabilitation, etc )   - Patient/family teaching  Outcome: Progressing     Problem: MUSCULOSKELETAL - ADULT  Goal: Maintain or return mobility to safest level of function  Description: INTERVENTIONS:  - Assess patient's ability to carry out ADLs; assess patient's baseline for ADL function and identify physical deficits which impact ability to perform ADLs (bathing, care of mouth/teeth, toileting, grooming, dressing, etc )  - Assess/evaluate cause of self-care deficits   - Assess range of motion  - Assess patient's mobility  - Assess patient's need for assistive devices and provide as appropriate  - Encourage maximum independence but intervene and supervise when necessary  - Involve family in performance of ADLs  - Assess for home care needs following discharge   - Consider OT consult to assist with ADL evaluation and planning for discharge  - Provide patient education as appropriate  Outcome: Progressing     Problem: PAIN - ADULT  Goal: Verbalizes/displays adequate comfort level or baseline comfort level  Description: Interventions:  - Encourage patient to monitor pain and request assistance  - Assess pain using appropriate pain scale  - Administer analgesics based on type and severity of pain and evaluate response  - Implement non-pharmacological measures as appropriate and evaluate response  - Consider cultural and social influences on pain and pain management  - Notify physician/advanced practitioner if interventions unsuccessful or patient reports new pain  Outcome: Progressing     Problem: INFECTION - ADULT  Goal: Absence or prevention of progression during hospitalization  Description: INTERVENTIONS:  - Assess and monitor for signs and symptoms of infection  - Monitor lab/diagnostic results  - Monitor all insertion sites, i e  indwelling lines, tubes, and drains  - Monitor endotracheal if appropriate and nasal secretions for changes in amount and color  - Ajo appropriate cooling/warming therapies per order  - Administer medications as ordered  - Instruct and encourage patient and family to use good hand hygiene technique  - Identify and instruct in appropriate isolation precautions for identified infection/condition  Outcome: Progressing     Problem: SAFETY ADULT  Goal: Patient will remain free of falls  Description: INTERVENTIONS:  - Assess patient frequently for physical needs  -  Identify cognitive and physical deficits and behaviors that affect risk of falls    -  [place] fall precautions as indicated by assessment   - Educate patient/family on patient safety including physical limitations  - Instruct patient to call for assistance with activity based on assessment  - Modify environment to reduce risk of injury  - Consider OT/PT consult to assist with strengthening/mobility  Outcome: Progressing     Problem: DISCHARGE PLANNING  Goal: Discharge to home or other facility with appropriate resources  Description: INTERVENTIONS:  - Identify barriers to discharge w/patient and caregiver  - Arrange for needed discharge resources and transportation as appropriate  - Identify discharge learning needs (meds, wound care, etc )  - Arrange for interpretive services to assist at discharge as needed  - Refer to Case Management Department for coordinating discharge planning if the patient needs post-hospital services based on physician/advanced practitioner order or complex needs related to functional status, cognitive ability, or social support system  Outcome: Progressing

## 2021-05-18 NOTE — ASSESSMENT & PLAN NOTE
· Diagnosed with COVID on 05/04  Had [facility]  · Patient received remdesivir, dexamethasone  Received 2 days worth of IV ceftriaxone and doxycycline but were discontinued due to negative procalcitonin  · Comes to the ED due to continued shortness of breath  Placed on 3 L nasal cannula (Chronically on 2L)   · Admit under mild pathway  · Inflammatory Markers   · D-Dimer: Ordered (elevated during last admission)   · Unable to have contrast due to allergy  · Patient is on eliquis 2 5 mg BID due to afib history, continue   · CRP: 100 3  · Ferritin pending   · Cardiac Markers   · Troponin: 0 06 (chronically elevated)  · BNP: 4,342 (decreased from discharge 5100)  · CK: ordered   · Continue ceftriaxone and doxycycline  D/C if procalcitonin negative x2  · Continue dexamethasone and vitamins   · Don't start remdesivir due to past first week of symptoms and patient already received during prior admission

## 2021-05-22 LAB
BACTERIA BLD CULT: NORMAL
BACTERIA BLD CULT: NORMAL

## 2021-05-26 NOTE — PHYSICIAN ADVISOR
Current patient class: Inpatient  The patient is currently on Hospital Day: 2 at [place]      The patient was admitted to the hospital at [facility] on 5/17/21 for the following diagnosis:  Hypoxemia [R09 02]  Hypokalemia [E87 6]  Lactic acidosis [E87 2]  Pneumonia [J18 9]  SOB (shortness of breath) [R06 02]  Elevated troponin [R77 8]  COVID-19 [U07 1]       There was documentation in the medical record of an expected length of stay of at least 2 midnights  The patient was therefore expected to satisfy the 2 midnight benchmark and given the 2 midnight presumption was appropriate for INPATIENT ADMISSION  Given this expectation of a satisfying stay, CMS instructs us that the patient is most often appropriate for inpatient admission under part A provided medical necessity is documented in the chart  After review of the relevant documentation, labs, vital signs and test results, the patient is appropriate for INPATIENT ADMISSION  Admission to the hospital as an inpatient is a complex decision making process which requires the practitioner to consider the patients presenting complaint, history and physical examination and all relevant testing  With this in mind, in this case, the patient was deemed appropriate for INPATIENT ADMISSION  After review of the documentation and testing available at the time of the admission, I concur with this clinical determination of medical necessity  For the reason noted, the patient was discharged before reaching 2 midnights as an inpatient  Rationale is as follows:     The patient is a 79 yrs old Male who presented to the ED at 5/17/2021  4:50 PM with a chief complaint of Weakness - Generalized (EMS states that patient was diagnosed with covid 19 a week ago and is feeling increasingly weak )  79 y o  male patient past medical history of COPD chronically on 2 L nasal cannula, tobacco abuse, ongoing, hypertension, PAD who originally presented to the hospital on 5/17/2021 due to worsening shortness of breath and cough noted at home  O2 requirements initially were 8l nc with paramedics with o2 sats around 90%  In ER, he was titrated down to 3l NC  Patient was recently admitted and treated for COVID-19 pneumonia from 5/4-5/7  He was increasing his home oxygen for worsening shortness of breath  He did  admit to poor oral intake for 3 days prior to admission and weakness  He was started on rocephin and doxycycline for possible pneumonia  CXR with r>L airspace opacities  Lactate elevated to 2 7  After fluids and potassium replacement, patient appears to be at baseline functional status as evaluated by PT/OT  Procalcitonin normal so antibiotics stopped and was tolerating baseline nasal cannula  Given his significant comorbidites and chronic respiratory failure with higher o2 requirements upon admission and cxr with concerns for pneumonia, he was inpatient appropriate with [facility]        The patients vitals on arrival were   ED Triage Vitals   Temperature Pulse Respirations Blood Pressure SpO2   05/17/21 1659 05/17/21 1649 05/17/21 1649 05/17/21 1649 05/17/21 1649   97 8 °F (36 6 °C) 85 20 134/79 100 %      Temp Source Heart Rate Source Patient Position - Orthostatic VS BP Location FiO2 (%)   05/17/21 1659 05/17/21 1830 05/17/21 1649 05/17/21 1649 --   Temporal Monitor Lying Right arm       Pain Score       05/18/21 0154       No Pain           Past Medical History:   Diagnosis Date    COPD (chronic obstructive pulmonary disease) (HCC)     COVID-19     Emphysema lung (HCC)     Hypokalemia 5/17/2021    Kidney failure     MI, old      Past Surgical History:   Procedure Laterality Date    BACK SURGERY      BELOW KNEE LEG AMPUTATION      CARDIAC SURGERY      HERNIA REPAIR             Consults have been placed to:   None    Vitals:    05/18/21 0200 05/18/21 0400 05/18/21 0800 05/18/21 1141   BP: 167/78 151/86 165/78 120/56   BP Location: Right arm Right arm  Right arm   Pulse: (!) 110 74 60 90   Resp: 20 20 19 (!) 34   Temp:   (!) 96 8 °F (36 °C) (!) 96 9 °F (36 1 °C)   TempSrc:   Oral Axillary   SpO2: 93% 99% 97% (!) 89%   Weight:       Height:           Most recent labs:    No results for input(s): WBC, HGB, HCT, PLT, K, NA, CALCIUM, BUN, CREATININE, LIPASE, AMYLASE, INR, TROPONINI, CKTOTAL, AST, ALT, ALKPHOS, BILITOT in the last 72 hours  Scheduled Meds:  Continuous Infusions:No current facility-administered medications for this encounter  PRN Meds:      Surgical procedures (if appropriate):